# Patient Record
Sex: FEMALE | Race: WHITE | Employment: FULL TIME | URBAN - METROPOLITAN AREA
[De-identification: names, ages, dates, MRNs, and addresses within clinical notes are randomized per-mention and may not be internally consistent; named-entity substitution may affect disease eponyms.]

---

## 2023-07-03 ENCOUNTER — HOSPITAL ENCOUNTER (OUTPATIENT)
Age: 43
Discharge: HOME | End: 2023-07-03
Payer: COMMERCIAL

## 2023-07-03 VITALS
RESPIRATION RATE: 18 BRPM | SYSTOLIC BLOOD PRESSURE: 114 MMHG | HEART RATE: 67 BPM | OXYGEN SATURATION: 100 % | TEMPERATURE: 98 F | DIASTOLIC BLOOD PRESSURE: 59 MMHG

## 2023-07-03 DIAGNOSIS — J02.9 ACUTE PHARYNGITIS, UNSPECIFIED ETIOLOGY: Primary | ICD-10-CM

## 2023-07-03 LAB — S PYO AG THROAT QL: NEGATIVE

## 2023-07-03 PROCEDURE — 99203 OFFICE O/P NEW LOW 30 MIN: CPT | Performed by: NURSE PRACTITIONER

## 2023-07-03 PROCEDURE — 87880 STREP A ASSAY W/OPTIC: CPT | Performed by: NURSE PRACTITIONER

## 2023-07-03 PROCEDURE — 87081 CULTURE SCREEN ONLY: CPT | Performed by: NURSE PRACTITIONER

## 2023-07-03 RX ORDER — LIDOCAINE HYDROCHLORIDE 20 MG/ML
5 SOLUTION OROPHARYNGEAL
Qty: 100 ML | Refills: 0 | Status: SHIPPED | OUTPATIENT
Start: 2023-07-03

## 2023-07-03 RX ORDER — IPRATROPIUM BROMIDE 42 UG/1
SPRAY, METERED NASAL
COMMUNITY
Start: 2023-02-05

## 2023-07-03 NOTE — ED INITIAL ASSESSMENT (HPI)
Pt states having right side throat pain, and ear pain that began about a week ago. Pt states has been using OTC medication but states symptoms are getting worse. Pt states swallowing now is very painful. Pt denies fevers or NVD.

## 2023-12-12 ENCOUNTER — HOSPITAL ENCOUNTER (OUTPATIENT)
Age: 43
Discharge: HOME | End: 2023-12-12
Payer: COMMERCIAL

## 2023-12-12 VITALS
HEART RATE: 68 BPM | TEMPERATURE: 99 F | OXYGEN SATURATION: 100 % | DIASTOLIC BLOOD PRESSURE: 80 MMHG | SYSTOLIC BLOOD PRESSURE: 124 MMHG | RESPIRATION RATE: 18 BRPM

## 2023-12-12 DIAGNOSIS — J02.0 STREP PHARYNGITIS: Primary | ICD-10-CM

## 2023-12-12 DIAGNOSIS — R05.9 COUGH: ICD-10-CM

## 2023-12-12 LAB
S PYO AG THROAT QL: NEGATIVE
SARS-COV-2 RNA RESP QL NAA+PROBE: NOT DETECTED

## 2023-12-12 PROCEDURE — 99213 OFFICE O/P EST LOW 20 MIN: CPT | Performed by: NURSE PRACTITIONER

## 2023-12-12 PROCEDURE — U0002 COVID-19 LAB TEST NON-CDC: HCPCS | Performed by: NURSE PRACTITIONER

## 2023-12-12 PROCEDURE — 87880 STREP A ASSAY W/OPTIC: CPT | Performed by: NURSE PRACTITIONER

## 2023-12-12 RX ORDER — PENICILLIN V POTASSIUM 500 MG/1
500 TABLET ORAL 2 TIMES DAILY
Qty: 20 TABLET | Refills: 0 | Status: SHIPPED | OUTPATIENT
Start: 2023-12-12 | End: 2023-12-22

## 2023-12-12 NOTE — DISCHARGE INSTRUCTIONS
As discussed, we will treat you for suspected strep throat based on physical examination and has been being treated on antibiotics. Antibiotics twice a day for 10 days. Please change her toothbrush by Thursday. Sleep somewhat elevated and upright. Sleep with humidifier. Steam showers for cough and congestion. Drink plenty of water and get plenty of rest.  You may take any over-the-counter cold medications for your cough. Tylenol every 4 hours Motrin for 6 hours as needed for discomfort. Go to ER if you have any worsening throat pain, inability swallow your own secretions, excessive drooling, stridor, hot potato voice or muffled voice.

## 2023-12-12 NOTE — ED INITIAL ASSESSMENT (HPI)
Pt traveling last week and  has bad sore throat (strep negative here on Sunday); denies fever and states she's experiencing beginning of symptoms and does not want to get as bad as ; asking for antibiotics

## 2024-06-15 ENCOUNTER — HOSPITAL ENCOUNTER (OUTPATIENT)
Age: 44
Discharge: HOME | End: 2024-06-15
Payer: COMMERCIAL

## 2024-06-15 VITALS
TEMPERATURE: 98 F | HEART RATE: 67 BPM | OXYGEN SATURATION: 100 % | SYSTOLIC BLOOD PRESSURE: 119 MMHG | DIASTOLIC BLOOD PRESSURE: 76 MMHG | RESPIRATION RATE: 18 BRPM

## 2024-06-15 DIAGNOSIS — H65.93 FLUID LEVEL BEHIND TYMPANIC MEMBRANE OF BOTH EARS: ICD-10-CM

## 2024-06-15 DIAGNOSIS — H92.03 OTALGIA OF BOTH EARS: Primary | ICD-10-CM

## 2024-06-15 NOTE — ED PROVIDER NOTES
Patient Seen in: [facility]      History     Chief Complaint   Patient presents with    Ear Problem Pain     Stated Complaint: Ear pain    Subjective:   HPI    This is a 44-year-old female presenting with ear pain and feeling of fluid in the ear.  Patient states 4 days ago she did go swimming and got water in both ears.  Patient states when she is stressed she also gets ear pain and she is under a lot of stress.  Denies any drainage from the ear outer ear swelling redness or warmth or Q-tip use.  Denies any sore throat runny nose cough or congestion or fever.    Objective:   History reviewed. No pertinent past medical history.           History reviewed. No pertinent surgical history.             Social History     Socioeconomic History    Marital status:    Tobacco Use    Smoking status: Never    Smokeless tobacco: Never     Social Determinants of Health     Food Insecurity: No Food Insecurity (8/21/2023)    Received from [facility], [facility]    Food Insecurity     Currently or in the past 3 months, have you worried your food would run out before you had money to buy more?: No     In the past 12 months, have you run out of food or been unable to get more?: No   Transportation Needs: No Transportation Needs (8/21/2023)    Received from [facility], [facility]    Transportation Needs     Medical Transportation Needs?: No    Received from [facility], [facility]    Social Connections    Received from [facility], [facility]    Housing Stability              Review of Systems    Positive for stated complaint: Ear pain  Other systems are as noted in HPI.  Constitutional and vital signs reviewed.      All other systems reviewed and negative except as noted above.    Physical Exam     ED Triage Vitals [06/15/24 1049]   /76   Pulse 67    Resp 18   Temp 97.9 °F (36.6 °C)   Temp src Temporal   SpO2 100 %   O2 Device None (Room air)       Current Vitals:   Vital Signs  BP: 119/76  Pulse: 67  Resp: 18  Temp: 97.9 °F (36.6 °C)  Temp src: Temporal    Oxygen Therapy  SpO2: 100 %  O2 Device: None (Room air)            Physical Exam  Vitals and nursing note reviewed.   Constitutional:       Appearance: Normal appearance.   HENT:      Ears:      Comments: Bilateral TMs are nonerythematous nonbulging there is fluid level behind the TM canals are clear no mastoid TTP no pain with pulling of the tragus     Nose: Nose normal. No rhinorrhea.      Mouth/Throat:      Mouth: Mucous membranes are moist.      Pharynx: Oropharynx is clear. No posterior oropharyngeal erythema.   Eyes:      Conjunctiva/sclera: Conjunctivae normal.   Musculoskeletal:         General: Normal range of motion.      Cervical back: Normal range of motion. No rigidity or tenderness.   Lymphadenopathy:      Cervical: No cervical adenopathy.   Neurological:      General: No focal deficit present.      Mental Status: She is alert and oriented to person, place, and time.              [facility]                 Medical Decision Making  44-year-old female well-appearing and nontoxic presenting with ear pain and fullness.  DDx OM versus OE versus otalgia versus cerumen impaction versus mastoiditis.  Physical exam is essentially unremarkable other than there is fluid level behind the TM no signs of a mastoiditis and no clinical indication for labs or imaging.  Discussed the findings with the patient.  Discussed with the patient over-the-counter antihistamine or decongestant ibuprofen or Tylenol for pain and following up with PCP discussed if symptoms persist follow-up with ENT resource provided.  All questions and concerns answered for the patient.  Patient acknowledged understanding discharge instructions.    Risk  OTC drugs.        Disposition and Plan     Clinical Impression:  1. Otalgia of both ears     2. Fluid level behind tympanic membrane of both ears         Disposition:  Discharge  6/15/2024 11:12 am    Follow-up:  Yudi Andrade MD  [address] [place] [address]  [phone]      Resource for ears nose and throat specialist, If symptoms worsen      Follow-up with your primary care provider in a week              Medications Prescribed:  Discharge Medication List as of 6/15/2024 11:13 AM

## 2024-06-15 NOTE — DISCHARGE INSTRUCTIONS
For the next 2 to 3 days take Zyrtec in the morning and a dose of Benadryl at bedtime to help with the fluid behind the eardrum then just do Zyrtec daily after 2 to 3 days of the combination.  Take over-the-counter ibuprofen or Tylenol for pain.  Follow-up with your primary care provider within a week if you continue to have ear discomfort or feel like you are swimming underwater or feel like there is still fluid behind the eardrum follow-up with ears nose and throat specialist.  If you develop any drainage from the ear outer ear swelling redness or warmth severe headache neck stiffness fever nausea or vomiting go to the nearest emergency department.

## 2024-06-15 NOTE — ED INITIAL ASSESSMENT (HPI)
Pt states was swimming about 4 days ago and got water in both her ears and has not been able to get it out and now having pain in ears.

## 2025-01-04 ENCOUNTER — HOSPITAL ENCOUNTER (OUTPATIENT)
Age: 45
Discharge: HOME | End: 2025-01-04
Payer: COMMERCIAL

## 2025-01-04 VITALS
TEMPERATURE: 98 F | OXYGEN SATURATION: 100 % | RESPIRATION RATE: 20 BRPM | SYSTOLIC BLOOD PRESSURE: 110 MMHG | DIASTOLIC BLOOD PRESSURE: 90 MMHG | HEART RATE: 69 BPM

## 2025-01-04 DIAGNOSIS — J02.9 ACUTE VIRAL PHARYNGITIS: Primary | ICD-10-CM

## 2025-01-04 LAB — S PYO AG THROAT QL: NEGATIVE

## 2025-01-04 PROCEDURE — 87880 STREP A ASSAY W/OPTIC: CPT | Performed by: NURSE PRACTITIONER

## 2025-01-04 PROCEDURE — 99213 OFFICE O/P EST LOW 20 MIN: CPT | Performed by: NURSE PRACTITIONER

## 2025-01-04 NOTE — ED PROVIDER NOTES
Patient Seen in: [facility]      History     Chief Complaint   Patient presents with    Sore Throat     Stated Complaint: sore throat    Subjective:   44-year-old female with unremarkable medical history presents with complaints of slight nasal congestion, slight postnasal drip and sore throat onset 3 days ago. Sore throat gradually worse this morning.  Patient recently returned from a trip. No fever/chills, cp, sob, difficulty swallowing. Wants to ensure that she does not have strep              Objective:     History reviewed. No pertinent past medical history.           History reviewed. No pertinent surgical history.             Social History     Socioeconomic History    Marital status:    Tobacco Use    Smoking status: Never    Smokeless tobacco: Never     Social Drivers of Health     Food Insecurity: No Food Insecurity (8/21/2023)    Received from [facility], [facility]    Food Insecurity     Currently or in the past 3 months, have you worried your food would run out before you had money to buy more?: No     In the past 12 months, have you run out of food or been unable to get more?: No   Transportation Needs: No Transportation Needs (8/21/2023)    Received from [facility], [facility]    Transportation Needs     Currently or in the past 3 months, has lack of transportation kept you from medical appointments, getting food or medicine, or providing care to a family member?: Unrecognized value     Medical Transportation Needs?: No    Received from [facility], [facility]    Social Connections    Received from [facility], [facility]    Housing Stability              Review of Systems   Constitutional:  Negative for chills and fever.   HENT:  Positive for congestion, postnasal drip and sore throat.    Respiratory:  Negative for cough.     Gastrointestinal:  Negative for abdominal pain, diarrhea, nausea and vomiting.   Neurological:  Negative for headaches.   All other systems reviewed and are negative.      Positive for stated complaint: sore throat  Other systems are as noted in HPI.  Constitutional and vital signs reviewed.      All other systems reviewed and negative except as noted above.    Physical Exam     ED Triage Vitals [01/04/25 0930]   /90   Pulse 69   Resp 20   Temp 98 °F (36.7 °C)   Temp src Oral   SpO2 100 %   O2 Device None (Room air)       Current Vitals:   Vital Signs  BP: 110/90  Pulse: 69  Resp: 20  Temp: 98 °F (36.7 °C)  Temp src: Oral    Oxygen Therapy  SpO2: 100 %  O2 Device: None (Room air)        Physical Exam  Vitals and nursing note reviewed.   Constitutional:       General: She is not in acute distress.     Appearance: Normal appearance. She is not ill-appearing.   HENT:      Head: Normocephalic.      Right Ear: Tympanic membrane and external ear normal.      Left Ear: Tympanic membrane and external ear normal.      Nose: Nose normal.      Mouth/Throat:      Mouth: Mucous membranes are moist.      Pharynx: Oropharynx is clear. Uvula midline.      Tonsils: No tonsillar exudate. 1+ on the right. 1+ on the left.   Cardiovascular:      Rate and Rhythm: Normal rate and regular rhythm.   Pulmonary:      Effort: Pulmonary effort is normal.      Breath sounds: Normal breath sounds.   Musculoskeletal:         General: Normal range of motion.      Cervical back: Normal range of motion and neck supple.   Lymphadenopathy:      Cervical: No cervical adenopathy.   Skin:     General: Skin is warm.   Neurological:      Mental Status: She is alert and oriented to person, place, and time.   Psychiatric:         Behavior: Behavior is cooperative.             ED Course     Labs Reviewed   POCT RAPID STREP - Normal                   MDM              Medical Decision Making  Patient is well-appearing.  I discussed differentials with  patient including but not limited to viral vs strep pharyngitis  Rapid strep negative  Push fluids, cool mist humidifier, warm salt water gargles, good hand washing  otc meds prn  Fu with PCP. Return/ ED precautions discussed      Problems Addressed:  Acute viral pharyngitis: acute illness or injury    Amount and/or Complexity of Data Reviewed  Labs: ordered. Decision-making details documented in ED Course.    Risk  OTC drugs.        Disposition and Plan     Clinical Impression:  1. Acute viral pharyngitis         Disposition:  Discharge  1/4/2025 10:10 am    Follow-up:  Marium Laughlin MD  [address] [address]  [phone]      or follow up with your Primary Doctor          Medications Prescribed:  Discharge Medication List as of 1/4/2025 10:16 AM              Supplementary Documentation:

## 2025-03-28 ENCOUNTER — HOSPITAL ENCOUNTER (OUTPATIENT)
Age: 45
Discharge: HOME | End: 2025-03-28
Payer: COMMERCIAL

## 2025-03-28 VITALS
RESPIRATION RATE: 20 BRPM | SYSTOLIC BLOOD PRESSURE: 105 MMHG | DIASTOLIC BLOOD PRESSURE: 67 MMHG | HEART RATE: 84 BPM | TEMPERATURE: 98 F | OXYGEN SATURATION: 100 %

## 2025-03-28 DIAGNOSIS — J06.9 UPPER RESPIRATORY TRACT INFECTION, UNSPECIFIED TYPE: Primary | ICD-10-CM

## 2025-03-28 DIAGNOSIS — Z11.52 ENCOUNTER FOR SCREENING FOR COVID-19: ICD-10-CM

## 2025-03-28 LAB
POCT INFLUENZA A: NEGATIVE
POCT INFLUENZA B: NEGATIVE
S PYO AG THROAT QL: NEGATIVE
SARS-COV-2 RNA RESP QL NAA+PROBE: NOT DETECTED

## 2025-03-28 PROCEDURE — 87880 STREP A ASSAY W/OPTIC: CPT | Performed by: NURSE PRACTITIONER

## 2025-03-28 PROCEDURE — 99214 OFFICE O/P EST MOD 30 MIN: CPT | Performed by: NURSE PRACTITIONER

## 2025-03-28 PROCEDURE — 87502 INFLUENZA DNA AMP PROBE: CPT | Performed by: NURSE PRACTITIONER

## 2025-03-28 PROCEDURE — U0002 COVID-19 LAB TEST NON-CDC: HCPCS | Performed by: NURSE PRACTITIONER

## 2025-03-28 RX ORDER — ONDANSETRON 4 MG/1
4 TABLET, ORALLY DISINTEGRATING ORAL EVERY 4 HOURS PRN
Qty: 10 TABLET | Refills: 0 | Status: SHIPPED | OUTPATIENT
Start: 2025-03-28 | End: 2025-04-04

## 2025-03-28 RX ORDER — NEOMYCIN SULFATE, POLYMYXIN B SULFATE, AND DEXAMETHASONE 3.5; 10000; 1 MG/G; [USP'U]/G; MG/G
0.5 OINTMENT OPHTHALMIC
COMMUNITY
Start: 2025-03-23 | End: 2025-03-30

## 2025-03-28 NOTE — ED PROVIDER NOTES
Patient Seen in: [facility]      History     Chief Complaint   Patient presents with    Sore Throat     Nausea aches stye - Entered by patient     Stated Complaint: Sore Throat - Nausea aches stye    Subjective:   HPI  Patient is an 45-year-old female that presents to the immediate care center today with concern for mild sore throat, congestion, nausea, and myalgia that started this morning.  She was traveling last week, but denies known exposure.  Pt. has been eating and drinking without difficulty.           Objective:     No pertinent past medical history.            No pertinent past surgical history.              No pertinent social history.            Review of Systems   Constitutional:  Positive for fatigue. Negative for chills and fever.   HENT:  Positive for congestion and sore throat.    Gastrointestinal:  Positive for nausea. Negative for diarrhea and vomiting.   Genitourinary:         Patient states unequivocally she does not believe she is pregnant; she declines pregnancy testing today.   Musculoskeletal:  Positive for myalgias.   Neurological:  Negative for dizziness and headaches.       Positive for stated complaint: Sore Throat - Nausea aches stye  Other systems are as noted in HPI.  Constitutional and vital signs reviewed.      All other systems reviewed and negative except as noted above.    Physical Exam     ED Triage Vitals [03/28/25 1148]   /67   Pulse 84   Resp 20   Temp 98 °F (36.7 °C)   Temp src Oral   SpO2 100 %   O2 Device None (Room air)       Current Vitals:   Vital Signs  BP: 105/67  Pulse: 84  Resp: 20  Temp: 98 °F (36.7 °C)  Temp src: Oral    Oxygen Therapy  SpO2: 100 %  O2 Device: None (Room air)        Physical Exam  Vitals and nursing note reviewed.   Constitutional:       General: She is not in acute distress.     Appearance: She is not ill-appearing.   HENT:      Right Ear: Tympanic membrane and ear canal normal.      Left Ear: Tympanic membrane and ear canal  normal.      Nose: Nose normal.      Mouth/Throat:      Mouth: Mucous membranes are moist.      Pharynx: No posterior oropharyngeal erythema.      Tonsils: No tonsillar exudate or tonsillar abscesses. 1+ on the right. 1+ on the left.   Eyes:      Conjunctiva/sclera: Conjunctivae normal.   Pulmonary:      Effort: Pulmonary effort is normal. No respiratory distress.      Breath sounds: Normal breath sounds.   Musculoskeletal:      Cervical back: Normal range of motion and neck supple.   Skin:     General: Skin is warm and dry.      Findings: No rash.   Neurological:      Mental Status: She is alert and oriented to person, place, and time.             ED Course     Labs Reviewed   POCT RAPID STREP - Normal   RAPID SARS-COV-2 BY PCR - Normal   POCT FLU TEST - Normal    Narrative:     This assay is a rapid molecular in vitro test utilizing nucleic acid amplification of influenza A and B viral RNA.                   MDM      Patient does have a hordeolum to the left upper eyelid for which she is taking antibiotics that had previously been prescribed.  She feels that this issue is being addressed appropriately.            Medical Decision Making  Differential diagnoses considered included, but are not exclusive of: bacterial vs viral sinusitis, dehydration, pneumonia, influenza, Covid-19 infection, and other viral upper respiratory infection.     Diff dx: Viral vs bacterial pharyngitis reviewed with patient, peritonsillar abscess considered.      Problems Addressed:  Upper respiratory tract infection, unspecified type: self-limited or minor problem    Amount and/or Complexity of Data Reviewed  Labs:  Decision-making details documented in ED Course.    Risk  OTC drugs.  Prescription drug management.        Disposition and Plan     Clinical Impression:  1. Upper respiratory tract infection, unspecified type    2. Encounter for screening for COVID-19         Disposition:  Discharge  3/28/2025 12:45 pm    Follow-up:  Your  primary care provider  Call to schedule appointment to be seen in 5-7 days.    As needed          Medications Prescribed:  Current Discharge Medication List        START taking these medications    Details   ondansetron 4 MG Oral Tablet Dispersible Take 1 tablet (4 mg total) by mouth every 4 (four) hours as needed for Nausea.  Qty: 10 tablet, Refills: 0                 Supplementary Documentation:

## 2025-03-28 NOTE — ED INITIAL ASSESSMENT (HPI)
Pt  has been dealing with a sty in her left eyelid that she's being treated for.  Pt states he daughter recently had strep. Pt states today woke up feeling nauseated body aches. Pt  does have hx of recurrent strep infections.

## 2025-05-04 ENCOUNTER — ANESTHESIA EVENT (OUTPATIENT)
Dept: SURGERY | Facility: HOSPITAL | Age: 45
End: 2025-05-04
Payer: COMMERCIAL

## 2025-05-04 ENCOUNTER — APPOINTMENT (OUTPATIENT)
Dept: CT IMAGING | Facility: HOSPITAL | Age: 45
End: 2025-05-04
Attending: PHYSICIAN ASSISTANT
Payer: COMMERCIAL

## 2025-05-04 ENCOUNTER — HOSPITAL ENCOUNTER (OUTPATIENT)
Age: 45
Discharge: ANOTHER HOSPITAL | End: 2025-05-04
Attending: PHYSICIAN ASSISTANT
Payer: COMMERCIAL

## 2025-05-04 ENCOUNTER — TELEPHONE (OUTPATIENT)
Dept: CASE MANAGEMENT | Facility: HOSPITAL | Age: 45
End: 2025-05-04

## 2025-05-04 ENCOUNTER — HOSPITAL ENCOUNTER (INPATIENT)
Facility: HOSPITAL | Age: 45
LOS: 1 days | Discharge: HOME | End: 2025-05-05
Attending: INTERNAL MEDICINE | Admitting: INTERNAL MEDICINE
Payer: COMMERCIAL

## 2025-05-04 ENCOUNTER — ANESTHESIA (OUTPATIENT)
Dept: SURGERY | Facility: HOSPITAL | Age: 45
End: 2025-05-04
Payer: COMMERCIAL

## 2025-05-04 VITALS
HEART RATE: 77 BPM | DIASTOLIC BLOOD PRESSURE: 79 MMHG | OXYGEN SATURATION: 100 % | RESPIRATION RATE: 20 BRPM | SYSTOLIC BLOOD PRESSURE: 103 MMHG | TEMPERATURE: 98 F

## 2025-05-04 DIAGNOSIS — R10.31 ABDOMINAL PAIN, RLQ: ICD-10-CM

## 2025-05-04 DIAGNOSIS — K35.890 OTHER ACUTE APPENDICITIS: Primary | ICD-10-CM

## 2025-05-04 DIAGNOSIS — K38.9 APPENDICOLITH: ICD-10-CM

## 2025-05-04 DIAGNOSIS — K35.80 ACUTE APPENDICITIS: ICD-10-CM

## 2025-05-04 DIAGNOSIS — Z15.89 PALB2 GENE MUTATION POSITIVE: ICD-10-CM

## 2025-05-04 LAB
#MXD IC: 0.6 X10ˆ3/UL (ref 0.1–1)
B-HCG UR QL: NEGATIVE
BILIRUB UR QL STRIP: NEGATIVE
BUN BLD-MCNC: 16 MG/DL (ref 7–18)
CHLORIDE BLD-SCNC: 105 MMOL/L (ref 98–112)
CLARITY UR: CLEAR
CO2 BLD-SCNC: 23 MMOL/L (ref 21–32)
COLOR UR: YELLOW
CREAT BLD-MCNC: 0.9 MG/DL (ref 0.55–1.02)
EGFRCR SERPLBLD CKD-EPI 2021: 80 ML/MIN/1.73M2 (ref 60–?)
GLUCOSE BLD-MCNC: 103 MG/DL (ref 70–99)
GLUCOSE UR STRIP-MCNC: NEGATIVE MG/DL
HCT VFR BLD AUTO: 37.9 % (ref 35–48)
HCT VFR BLD CALC: 39 % (ref 34–50)
HGB BLD-MCNC: 12.8 G/DL (ref 12–16)
ISTAT IONIZED CALCIUM FOR CHEM 8: 1.27 MMOL/L (ref 1.12–1.32)
KETONES UR STRIP-MCNC: NEGATIVE MG/DL
LEUKOCYTE ESTERASE UR QL STRIP: NEGATIVE
LYMPHOCYTES # BLD AUTO: 1.7 X10ˆ3/UL (ref 1–4)
LYMPHOCYTES NFR BLD AUTO: 14 %
MCH RBC QN AUTO: 29.8 PG (ref 26–34)
MCHC RBC AUTO-ENTMCNC: 33.8 G/DL (ref 31–37)
MCV RBC AUTO: 88.3 FL (ref 80–100)
MIXED CELL %: 5.1 %
NEUTROPHILS # BLD AUTO: 9.9 X10ˆ3/UL (ref 1.5–7.7)
NEUTROPHILS NFR BLD AUTO: 80.9 %
NITRITE UR QL STRIP: NEGATIVE
PH UR STRIP: 6.5 [PH]
PLATELET # BLD AUTO: 175 X10ˆ3/UL (ref 150–450)
POTASSIUM BLD-SCNC: 3.6 MMOL/L (ref 3.6–5.1)
PROT UR STRIP-MCNC: NEGATIVE MG/DL
RBC # BLD AUTO: 4.29 X10ˆ6/UL (ref 3.8–5.3)
SODIUM BLD-SCNC: 139 MMOL/L (ref 136–145)
SP GR UR STRIP: 1.01
UROBILINOGEN UR STRIP-ACNC: <2 MG/DL
WBC # BLD AUTO: 12.2 X10ˆ3/UL (ref 4–11)

## 2025-05-04 PROCEDURE — 0DTJ4ZZ RESECTION OF APPENDIX, PERCUTANEOUS ENDOSCOPIC APPROACH: ICD-10-PCS | Performed by: SURGERY

## 2025-05-04 PROCEDURE — 81002 URINALYSIS NONAUTO W/O SCOPE: CPT | Performed by: PHYSICIAN ASSISTANT

## 2025-05-04 PROCEDURE — 44970 LAPAROSCOPY APPENDECTOMY: CPT | Performed by: SURGERY

## 2025-05-04 PROCEDURE — 74177 CT ABD & PELVIS W/CONTRAST: CPT | Performed by: PHYSICIAN ASSISTANT

## 2025-05-04 PROCEDURE — 80047 BASIC METABLC PNL IONIZED CA: CPT | Performed by: PHYSICIAN ASSISTANT

## 2025-05-04 PROCEDURE — 99215 OFFICE O/P EST HI 40 MIN: CPT | Performed by: PHYSICIAN ASSISTANT

## 2025-05-04 PROCEDURE — 99222 1ST HOSP IP/OBS MODERATE 55: CPT | Performed by: SURGERY

## 2025-05-04 PROCEDURE — 85025 COMPLETE CBC W/AUTO DIFF WBC: CPT | Performed by: PHYSICIAN ASSISTANT

## 2025-05-04 RX ORDER — NALOXONE HYDROCHLORIDE 0.4 MG/ML
0.08 INJECTION, SOLUTION INTRAMUSCULAR; INTRAVENOUS; SUBCUTANEOUS AS NEEDED
Status: DISCONTINUED | OUTPATIENT
Start: 2025-05-04 | End: 2025-05-04 | Stop reason: HOSPADM

## 2025-05-04 RX ORDER — HYDROCODONE BITARTRATE AND ACETAMINOPHEN 5; 325 MG/1; MG/1
2 TABLET ORAL EVERY 4 HOURS PRN
Status: DISCONTINUED | OUTPATIENT
Start: 2025-05-04 | End: 2025-05-05

## 2025-05-04 RX ORDER — ONDANSETRON 2 MG/ML
INJECTION INTRAMUSCULAR; INTRAVENOUS AS NEEDED
Status: DISCONTINUED | OUTPATIENT
Start: 2025-05-04 | End: 2025-05-04 | Stop reason: SURG

## 2025-05-04 RX ORDER — MIDAZOLAM HYDROCHLORIDE 1 MG/ML
INJECTION INTRAMUSCULAR; INTRAVENOUS AS NEEDED
Status: DISCONTINUED | OUTPATIENT
Start: 2025-05-04 | End: 2025-05-04 | Stop reason: SURG

## 2025-05-04 RX ORDER — PROCHLORPERAZINE EDISYLATE 5 MG/ML
5 INJECTION INTRAMUSCULAR; INTRAVENOUS EVERY 8 HOURS PRN
Status: DISCONTINUED | OUTPATIENT
Start: 2025-05-04 | End: 2025-05-05

## 2025-05-04 RX ORDER — LIDOCAINE HYDROCHLORIDE 10 MG/ML
INJECTION, SOLUTION EPIDURAL; INFILTRATION; INTRACAUDAL; PERINEURAL AS NEEDED
Status: DISCONTINUED | OUTPATIENT
Start: 2025-05-04 | End: 2025-05-04 | Stop reason: SURG

## 2025-05-04 RX ORDER — DEXAMETHASONE SODIUM PHOSPHATE 4 MG/ML
VIAL (ML) INJECTION AS NEEDED
Status: DISCONTINUED | OUTPATIENT
Start: 2025-05-04 | End: 2025-05-04 | Stop reason: SURG

## 2025-05-04 RX ORDER — CETIRIZINE HYDROCHLORIDE 1 MG/ML
5 SOLUTION ORAL DAILY
COMMUNITY

## 2025-05-04 RX ORDER — SODIUM CHLORIDE, SODIUM LACTATE, POTASSIUM CHLORIDE, CALCIUM CHLORIDE 600; 310; 30; 20 MG/100ML; MG/100ML; MG/100ML; MG/100ML
INJECTION, SOLUTION INTRAVENOUS CONTINUOUS
Status: DISCONTINUED | OUTPATIENT
Start: 2025-05-04 | End: 2025-05-04 | Stop reason: HOSPADM

## 2025-05-04 RX ORDER — MORPHINE SULFATE 2 MG/ML
2 INJECTION, SOLUTION INTRAMUSCULAR; INTRAVENOUS EVERY 2 HOUR PRN
Status: DISCONTINUED | OUTPATIENT
Start: 2025-05-04 | End: 2025-05-05

## 2025-05-04 RX ORDER — SODIUM CHLORIDE 9 MG/ML
INJECTION, SOLUTION INTRAVENOUS CONTINUOUS
Status: DISCONTINUED | OUTPATIENT
Start: 2025-05-04 | End: 2025-05-05

## 2025-05-04 RX ORDER — HYDROCODONE BITARTRATE AND ACETAMINOPHEN 5; 325 MG/1; MG/1
1 TABLET ORAL EVERY 4 HOURS PRN
Status: DISCONTINUED | OUTPATIENT
Start: 2025-05-04 | End: 2025-05-05

## 2025-05-04 RX ORDER — PROCHLORPERAZINE EDISYLATE 5 MG/ML
5 INJECTION INTRAMUSCULAR; INTRAVENOUS EVERY 8 HOURS PRN
Status: DISCONTINUED | OUTPATIENT
Start: 2025-05-04 | End: 2025-05-04 | Stop reason: HOSPADM

## 2025-05-04 RX ORDER — ONDANSETRON 2 MG/ML
4 INJECTION INTRAMUSCULAR; INTRAVENOUS EVERY 6 HOURS PRN
Status: DISCONTINUED | OUTPATIENT
Start: 2025-05-04 | End: 2025-05-05

## 2025-05-04 RX ORDER — MORPHINE SULFATE 4 MG/ML
4 INJECTION, SOLUTION INTRAMUSCULAR; INTRAVENOUS EVERY 10 MIN PRN
Status: DISCONTINUED | OUTPATIENT
Start: 2025-05-04 | End: 2025-05-04 | Stop reason: HOSPADM

## 2025-05-04 RX ORDER — IBUPROFEN 600 MG/1
600 TABLET, FILM COATED ORAL EVERY 6 HOURS PRN
Qty: 15 TABLET | Refills: 1 | Status: SHIPPED | OUTPATIENT
Start: 2025-05-04 | End: 2025-05-11

## 2025-05-04 RX ORDER — HYDROMORPHONE HYDROCHLORIDE 1 MG/ML
0.2 INJECTION, SOLUTION INTRAMUSCULAR; INTRAVENOUS; SUBCUTANEOUS EVERY 5 MIN PRN
Status: DISCONTINUED | OUTPATIENT
Start: 2025-05-04 | End: 2025-05-04 | Stop reason: HOSPADM

## 2025-05-04 RX ORDER — HYDROMORPHONE HYDROCHLORIDE 1 MG/ML
0.4 INJECTION, SOLUTION INTRAMUSCULAR; INTRAVENOUS; SUBCUTANEOUS EVERY 5 MIN PRN
Status: DISCONTINUED | OUTPATIENT
Start: 2025-05-04 | End: 2025-05-04 | Stop reason: HOSPADM

## 2025-05-04 RX ORDER — SODIUM CHLORIDE, SODIUM LACTATE, POTASSIUM CHLORIDE, CALCIUM CHLORIDE 600; 310; 30; 20 MG/100ML; MG/100ML; MG/100ML; MG/100ML
INJECTION, SOLUTION INTRAVENOUS CONTINUOUS PRN
Status: DISCONTINUED | OUTPATIENT
Start: 2025-05-04 | End: 2025-05-04 | Stop reason: SURG

## 2025-05-04 RX ORDER — MORPHINE SULFATE 2 MG/ML
2 INJECTION, SOLUTION INTRAMUSCULAR; INTRAVENOUS EVERY 10 MIN PRN
Status: DISCONTINUED | OUTPATIENT
Start: 2025-05-04 | End: 2025-05-04 | Stop reason: HOSPADM

## 2025-05-04 RX ORDER — ONDANSETRON 2 MG/ML
4 INJECTION INTRAMUSCULAR; INTRAVENOUS EVERY 6 HOURS PRN
Status: DISCONTINUED | OUTPATIENT
Start: 2025-05-04 | End: 2025-05-04 | Stop reason: HOSPADM

## 2025-05-04 RX ORDER — METRONIDAZOLE 500 MG/100ML
500 INJECTION, SOLUTION INTRAVENOUS EVERY 8 HOURS
Status: DISCONTINUED | OUTPATIENT
Start: 2025-05-04 | End: 2025-05-05

## 2025-05-04 RX ORDER — KETOROLAC TROMETHAMINE 30 MG/ML
30 INJECTION, SOLUTION INTRAMUSCULAR; INTRAVENOUS EVERY 6 HOURS PRN
Status: DISCONTINUED | OUTPATIENT
Start: 2025-05-04 | End: 2025-05-05

## 2025-05-04 RX ORDER — MORPHINE SULFATE 10 MG/ML
6 INJECTION, SOLUTION INTRAMUSCULAR; INTRAVENOUS EVERY 10 MIN PRN
Status: DISCONTINUED | OUTPATIENT
Start: 2025-05-04 | End: 2025-05-04 | Stop reason: HOSPADM

## 2025-05-04 RX ORDER — HYDROMORPHONE HYDROCHLORIDE 1 MG/ML
0.6 INJECTION, SOLUTION INTRAMUSCULAR; INTRAVENOUS; SUBCUTANEOUS EVERY 5 MIN PRN
Status: DISCONTINUED | OUTPATIENT
Start: 2025-05-04 | End: 2025-05-04 | Stop reason: HOSPADM

## 2025-05-04 RX ORDER — PHENTERMINE AND TOPIRAMATE 7.5; 46 MG/1; MG/1
CAPSULE, EXTENDED RELEASE ORAL DAILY
COMMUNITY

## 2025-05-04 RX ORDER — ROCURONIUM BROMIDE 10 MG/ML
INJECTION, SOLUTION INTRAVENOUS AS NEEDED
Status: DISCONTINUED | OUTPATIENT
Start: 2025-05-04 | End: 2025-05-04 | Stop reason: SURG

## 2025-05-04 RX ORDER — BUPIVACAINE HYDROCHLORIDE AND EPINEPHRINE 2.5; 5 MG/ML; UG/ML
INJECTION, SOLUTION INFILTRATION; PERINEURAL AS NEEDED
Status: DISCONTINUED | OUTPATIENT
Start: 2025-05-04 | End: 2025-05-04 | Stop reason: HOSPADM

## 2025-05-04 RX ORDER — MORPHINE SULFATE 2 MG/ML
1 INJECTION, SOLUTION INTRAMUSCULAR; INTRAVENOUS EVERY 2 HOUR PRN
Status: DISCONTINUED | OUTPATIENT
Start: 2025-05-04 | End: 2025-05-05

## 2025-05-04 RX ORDER — MORPHINE SULFATE 4 MG/ML
4 INJECTION, SOLUTION INTRAMUSCULAR; INTRAVENOUS EVERY 2 HOUR PRN
Status: DISCONTINUED | OUTPATIENT
Start: 2025-05-04 | End: 2025-05-05

## 2025-05-04 RX ADMIN — SODIUM CHLORIDE, SODIUM LACTATE, POTASSIUM CHLORIDE, CALCIUM CHLORIDE: 600; 310; 30; 20 INJECTION, SOLUTION INTRAVENOUS at 16:54:00

## 2025-05-04 RX ADMIN — ONDANSETRON 4 MG: 2 INJECTION INTRAMUSCULAR; INTRAVENOUS at 16:41:00

## 2025-05-04 RX ADMIN — LIDOCAINE HYDROCHLORIDE 50 MG: 10 INJECTION, SOLUTION EPIDURAL; INFILTRATION; INTRACAUDAL; PERINEURAL at 16:35:00

## 2025-05-04 RX ADMIN — DEXAMETHASONE SODIUM PHOSPHATE 4 MG: 4 MG/ML VIAL (ML) INJECTION at 16:41:00

## 2025-05-04 RX ADMIN — MIDAZOLAM HYDROCHLORIDE 2 MG: 1 INJECTION INTRAMUSCULAR; INTRAVENOUS at 16:30:00

## 2025-05-04 RX ADMIN — ROCURONIUM BROMIDE 25 MG: 10 INJECTION, SOLUTION INTRAVENOUS at 16:35:00

## 2025-05-04 RX ADMIN — SODIUM CHLORIDE, SODIUM LACTATE, POTASSIUM CHLORIDE, CALCIUM CHLORIDE: 600; 310; 30; 20 INJECTION, SOLUTION INTRAVENOUS at 16:29:00

## 2025-05-04 NOTE — ANESTHESIA PROCEDURE NOTES
Airway  Date/Time: 5/4/2025 4:37 PM  Reason: Elective    Airway not difficult    General Information and Staff   Patient location during procedure: OR  Anesthesiologist: Robin Giles MD  Performed: anesthesiologist   Performed by: Robin Giles MD  Authorized by: Robin Giles MD        Indications and Patient Condition  Indications for airway management: anesthesia  Sedation level: deep      Preoxygenated: yesPatient position: sniffing  MILS maintained throughout    Mask difficulty assessment: 1 - vent by mask    Final Airway Details    Final airway type: endotracheal airway    Successful airway: ETT  Cuffed: yes   Successful intubation technique: direct laryngoscopy  Endotracheal tube insertion site: oral  Blade: Osvaldo  Blade size: #3  ETT size (mm): 7.5    Cormack-Lehane Classification: grade I - full view of glottis  Placement verified by: capnometry   Cuff volume (mL): 6  Measured from: teeth  ETT to teeth (cm): 21  Number of attempts at approach: 1  Number of other approaches attempted: 0

## 2025-05-04 NOTE — ED PROVIDER NOTES
Patient Seen in: [facility]      History     Chief Complaint   Patient presents with    Abdominal Pain     Stated Complaint: Abdominal pain    Subjective:   HPI    Patient is a 35-year-old female with PALB2 gene mutation positive, increased risk of breast cancer, family history of pancreatic cancer, presenting to immediate care for evaluation of abdominal pain.  Onset: Last night.  Initially abdominal pain and upper abdomen.  Symptoms rating to right lower quadrant.  She is concerned for possible appendicitis.  She had episode of nausea with black stool this morning.  Patient attributes symptoms to possibly she ate yesterday.  States she ate chicken sausage, chomp beef stick, and ate chorizo for dinner. 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 She denies any fevers.  No chest pain or shortness of breath.  No back, flank, pelvic pain.  No urinary symptoms.  No blood in stools.  Denies diarrhea or constipation.  Gets routine ultrasound and MRI/MRCP abdomen for pancreatic history surveillance..  Prior colonoscopy notable for colonic polyps.      History of Present Illness               Objective:     History reviewed. No pertinent past medical history.           History reviewed. No pertinent surgical history.             No pertinent social history.            Review of Systems   Constitutional:  Negative for activity change, chills and fever.   Respiratory:  Negative for shortness of breath.    Cardiovascular:  Negative for chest pain.   Gastrointestinal:  Positive for abdominal pain and nausea. Negative for abdominal distention, anal bleeding, blood in stool, constipation, diarrhea, rectal pain and vomiting.   Genitourinary:  Negative for difficulty urinating, dysuria, flank pain and hematuria.   Musculoskeletal:  Negative for back pain.   Skin:  Negative for rash.   Allergic/Immunologic: Negative for immunocompromised state.   Neurological:  Negative for dizziness, weakness, light-headedness and  headaches.   Psychiatric/Behavioral:  Negative for confusion.    All other systems reviewed and are negative.      Positive for stated complaint: Abdominal pain  Other systems are as noted in HPI.  Constitutional and vital signs reviewed.      All other systems reviewed and negative except as noted above.                  Physical Exam     ED Triage Vitals [05/04/25 0814]   /79   Pulse 77   Resp 20   Temp 98.4 °F (36.9 °C)   Temp src Oral   SpO2 100 %   O2 Device None (Room air)       Current Vitals:   Vital Signs  BP: 103/79  Pulse: 77  Resp: 20  Temp: 98.4 °F (36.9 °C)  Temp src: Oral    Oxygen Therapy  SpO2: 100 %  O2 Device: None (Room air)        Physical Exam  Vitals and nursing note reviewed.   Constitutional:       General: She is not in acute distress.     Appearance: Normal appearance. She is well-developed. She is not ill-appearing, toxic-appearing or diaphoretic.      Comments: Alert, well-appearing, no acute distress.   HENT:      Head: Normocephalic and atraumatic.      Mouth/Throat:      Mouth: Mucous membranes are moist.   Eyes:      Conjunctiva/sclera: Conjunctivae normal.   Cardiovascular:      Rate and Rhythm: Normal rate and regular rhythm.      Pulses: Normal pulses.   Pulmonary:      Effort: Pulmonary effort is normal. No respiratory distress.   Abdominal:      Tenderness: There is abdominal tenderness in the right lower quadrant.      Comments: Tenderness to palpation periumbilical and right lower quadrant.  No guarding or rebound tenderness.  Soft abdomen.  Negative Rodriguez signs.  No flank or CVA tenderness.   Musculoskeletal:         General: No deformity. Normal range of motion.      Cervical back: Normal range of motion. No rigidity.   Skin:     Capillary Refill: Capillary refill takes less than 2 seconds.      Coloration: Skin is not mottled.      Findings: No erythema.   Neurological:      General: No focal deficit present.      Mental Status: She is alert and oriented to person,  place, and time.      GCS: GCS eye subscore is 4. GCS verbal subscore is 5. GCS motor subscore is 6.      Motor: No weakness.      Gait: Gait normal.      Comments: Moving all extremities, full range of motion, nontender, no edema   Psychiatric:         Mood and Affect: Mood normal. Mood is not anxious or depressed.         Behavior: Behavior normal.         Physical Exam                ED Course     Labs Reviewed   POCT CBC - Abnormal; Notable for the following components:       Result Value    WBC IC 12.2 (*)     # Neutrophil 9.9 (*)     All other components within normal limits   [facility] POCT URINALYSIS DIPSTICK - Abnormal; Notable for the following components:    Blood, Urine Trace-lysed (*)     All other components within normal limits   POCT ISTAT CHEM8 CARTRIDGE - Abnormal; Notable for the following components:    ISTAT Glucose 103 (*)     All other components within normal limits     Results for orders placed or performed during the hospital encounter of 05/04/25   POCT CBC    Collection Time: 05/04/25  8:46 AM   Result Value Ref Range    WBC IC 12.2 (H) 4.0 - 11.0 x10ˆ3/uL    RBC IC 4.29 3.80 - 5.30 X10ˆ6/uL    HGB IC 12.8 12.0 - 16.0 g/dL    HCT IC 37.9 35.0 - 48.0 %    MCV IC 88.3 80.0 - 100.0 fL    MCH IC 29.8 26.0 - 34.0 pg    MCHC IC 33.8 31.0 - 37.0 g/dL    PLT .0 150.0 - 450.0 X10ˆ3/uL    # Neutrophil 9.9 (H) 1.5 - 7.7 X10ˆ3/uL    # Lymphocyte 1.7 1.0 - 4.0 X10ˆ3/uL    # Mixed Cells 0.6 0.1 - 1.0 X10ˆ3/uL    Neutrophil % 80.9 %    Lymphocyte % 14.0 %    Mixed Cell % 5.1 %   POCT Urinalysis Dipstick    Collection Time: 05/04/25  8:52 AM   Result Value Ref Range    Urine Color Yellow Yellow    Urine Clarity Clear Clear    Specific Gravity, Urine 1.010 1.005 - 1.030    PH, Urine 6.5 5.0 - 8.0    Protein urine Negative Negative mg/dL    Glucose, Urine Negative Negative mg/dL    Ketone, Urine Negative Negative mg/dL    Bilirubin, Urine Negative Negative    Blood, Urine Trace-lysed (A) Negative     Nitrite Urine Negative Negative    Urobilinogen urine <2.0 <2.0 mg/dL    Leukocyte esterase urine Negative Negative   POCT ISTAT chem8 cartridge    Collection Time: 05/04/25  9:00 AM   Result Value Ref Range    ISTAT Sodium 139 136 - 145 mmol/L    ISTAT BUN 16 7 - 18 mg/dL    ISTAT Potassium 3.6 3.6 - 5.1 mmol/L    ISTAT Chloride 105 98 - 112 mmol/L    ISTAT Ionized Calcium 1.27 1.12 - 1.32 mmol/L    ISTAT Hematocrit 39 34 - 50 %    ISTAT Glucose 103 (H) 70 - 99 mg/dL    ISTAT TCO2 23 21 - 32 mmol/L    ISTAT Creatinine 0.90 0.55 - 1.02 mg/dL    eGFR-Cr 80 >=60 mL/min/1.73m2     CT APPENDIX ABD/PEL W CONTRAST (CPT=74177)   Final Result   PROCEDURE: CT APPENDIX ABD PEL W CONTRAST (CPT=74177)       COMPARISON: None available.         INDICATIONS: Right upper and lower quadrant abdominal pain. History of    gene mutation for high risk pancreatic cancer with family history.       TECHNIQUE: Multidetector CT images of the abdomen and pelvis were obtained    with non-ionic intravenous contrast material. Automated exposure control    for dose reduction was used. Adjustment of the mA and/or kV was done based    on the patient's size.    Iterative reconstruction technique for dose reduction was employed. Dose    information was transmitted to the ACR (American College of Radiology)    NRDR (National Radiology Data Registry), which includes the Dose Index    Registry. Oral contrast was not    ingested.       FINDINGS:   LUNG BASES: The heart is normal in size. There is no visible pulmonary or    pleural disease.    LIVER: No enlargement, atrophy, abnormal density, or significant focal    lesion is identified.     BILIARY: The gallbladder is present.   PANCREAS: No lesion, fluid collection, ductal dilatation, or atrophy.     SPLEEN: No enlargement.     ADRENALS:   No defined mass or abnormal enlargement.     KIDNEYS:   Symmetric enhancement is seen without evidence of    hydronephrosis or underlying solid masses.     GI/MESENTERY:  Fluid-filled loops of small bowel are present. There is no    evidence of bowel obstruction. There is a suspected punctate    appendicoliths at the base of the cecum/appendiceal origin (series 5,    image 29; series 2, image 95). The appendix is    dilated in caliber, measuring up to 1.1 cm, and is fluid-filled with    concurrent mucosal hyperemia and surrounding periappendiceal infiltration.    No loculated, drainable periappendiceal fluid collection is seen to    suggest abscess formation.     URINARY BLADDER: No visible calculus or focal wall thickening.     PELVIC NODES: No lymphadenopathy.      PELVIC ORGANS: The uterus is present. A left adnexal follicular cystic    lesion is seen measuring 1.8 x 1.5 x 1.4 cm. Small volume free pelvic    fluid is observed. Deep pelvic calcifications likely represent    phleboliths.      VASCULATURE:   No aneurysm is detected.   RETROPERITONEUM: No mass or lymphadenopathy is apparent.     BONES:   Mild left convex curvature of the lumbar spine is present. Mild    multilevel degenerative changes of the spine are appreciated.   ABDOMINAL WALL: There is a small fat-containing umbilical hernia.   OTHER: No free air is seen in the abdomen or pelvis.                         =====   CONCLUSION:    1. Acute appendicitis with associated apparent obstructing proximal    appendicolith. There is no evidence of periappendiceal abscess formation.        2. Left ovarian probable physiologic follicle.       3. Lesser incidental findings as above.               This report was communicated by telephone to the CT technologist at the    dictation time shown below.  She will notify the patient's physician.             Dictated by (CST): Jn Bustillos MD on 5/04/2025 at 10:08 AM        Finalized by (CST): Jn Bustillos MD on 5/04/2025 at 10:13 AM                      Results                   MDM     Patient is 45-year-old female with history above, presenting to immediate care  for evaluation of abdominal pain that started last night.  Initially upper abdomen/periumbilical and now rating to right lower quadrant.  She is concerned for possible appendicitis.  No fever or systemic symptoms.  Does endorse slight initial nausea and dark stool this morning.  Patient is without fever.  She is well-appearing.  She is tender to palpation right lower quadrant without guarding or rebound tenderness.  Further evaluation with abdominal screening labs.  Labs notable for leukocytosis with left shift.  WBC 12.2.  Normal renal function.  Urine negative for UTI.  Patient without urinary symptoms.  Further evaluation of right lower quadrant pain with rule out acute appendicitis as clinically question.  CT abdomen pelvis with acute appendicitis with appendicolith.  No abscess.  Patient requires hospital admission  for IV antibiotics and surgical management of acute appendicitis.  Case discussed with hospitalist, admitting MD Dr. Hanna De Anda.  General surgery Dr. Rodriguez notified.  Patient currently NPO.  Last meal 7 AM this morning.      Medical Decision Making      Disposition and Plan     Clinical Impression:  1. Other acute appendicitis    2. Abdominal pain, RLQ    3. Appendicolith    4. PALB2 gene mutation positive         Disposition:  Ic direct admit  5/4/2025 10:26 am    Follow-up:  No follow-up provider specified.        Medications Prescribed:  Current Discharge Medication List          Supplementary Documentation:

## 2025-05-04 NOTE — BRIEF OP NOTE
Pre-Operative Diagnosis: Acute appendicitis [K35.80]     Post-Operative Diagnosis: Acute appendicitis [K35.80]      Procedure Performed:   LAPAROSCOPIC  APPENDECTOMY    Surgeons and Role:     * Charlie Rodriguez MD - Primary    Assistant(s):  Surgical Assistant.: Salvador Robles CSA     Surgical Findings: Acute appendicitis     Specimen: Appendix     Estimated Blood Loss: Blood Output: 5 mL (5/4/2025  4:50 PM)      Dictation Number:      Charlie Rodriguez MD  5/4/2025  4:56 PM

## 2025-05-04 NOTE — PLAN OF CARE
A&O x4, RA, continent, ambulating. Clear diet, advance as tolerated. 3 incisions,, 2 steristrips with bandaids, 1 4x4 derma bond.     Problem: PAIN - ADULT  Goal: Verbalizes/displays adequate comfort level or patient's stated pain goal  Description: INTERVENTIONS:- Encourage pt to monitor pain and request assistance- Assess pain using appropriate pain scale- Administer analgesics based on type and severity of pain and evaluate response- Implement non-pharmacological measures as appropriate and evaluate response- Consider cultural and social influences on pain and pain management- Manage/alleviate anxiety- Utilize distraction and/or relaxation techniques- Monitor for opioid side effects- Notify MD/LIP if interventions unsuccessful or patient reports new pain- Anticipate increased pain with activity and pre-medicate as appropriate  Outcome: Progressing     Problem: RISK FOR INFECTION - ADULT  Goal: Absence of fever/infection during anticipated neutropenic period  Description: INTERVENTIONS- Monitor WBC- Administer growth factors as ordered- Implement neutropenic guidelines  Outcome: Progressing

## 2025-05-04 NOTE — ANESTHESIA PREPROCEDURE EVALUATION
Anesthesia PreOp Note    HPI:     Odalys Roche is a 45 year old female who presents for preoperative consultation requested by: Charlie Rodriguez MD    Date of Surgery: 5/4/2025    Procedure(s):  LAPAROSCOPIC  POSSIBLE OPEN APPENDECTOMY  Indication: Acute appendicitis [K35.80]    Relevant Problems   No relevant active problems       NPO:  Last Liquid Consumption Date: 05/04/25  Last Liquid Consumption Time: 0930  Last Solid Consumption Date: 05/04/25  Last Solid Consumption Time: 0730  Last Liquid Consumption Date: 05/04/25          History Review:  Patient Active Problem List    Diagnosis Date Noted   • Acute appendicitis 05/04/2025       Past Medical History[1]    Past Surgical History[2]    Prescriptions Prior to Admission[3]  Current Medications and Prescriptions Ordered in Epic[4]    Allergies[5]    Family History[6]  Social Hx on file[7]    Available pre-op labs reviewed.  Lab Results   Component Value Date    MCV 88.3 05/04/2025    MCHC 33.8 05/04/2025    PREGU Negative 05/04/2025             Vital Signs:  There is no height or weight on file to calculate BMI.   oral temperature is 98.4 °F (36.9 °C). Her blood pressure is 111/75 and her pulse is 74. Her respiration is 16 and oxygen saturation is 100%.   Vitals:    05/04/25 1216   BP: 111/75   Pulse: 74   Resp: 16   Temp: 98.4 °F (36.9 °C)   TempSrc: Oral   SpO2: 100%        Anesthesia Evaluation     Patient summary reviewed and Nursing notes reviewed    No history of anesthetic complications   Airway   Mallampati: II  TM distance: >3 FB  Neck ROM: full  Dental - Dentition appears grossly intact         Pulmonary - negative ROS and normal exam   Cardiovascular - negative ROS and normal exam  Exercise tolerance: good    Neuro/Psych - negative ROS     GI/Hepatic/Renal - negative ROS     Endo/Other - negative ROS   Abdominal  - normal exam               Anesthesia Plan:   ASA:  1  Emergent    Plan:   General  Airway:  ETT  Post-op Pain Management: IV  analgesics and Oral pain medication  Informed Consent Plan and Risks Discussed With:  Patient and spouse      I have informed Odalys Roche of the nature of the anesthetic plan, benefits, risks including possible dental damage if relevant, major complications, and any alternative forms of anesthetic management.   All of the patient's questions were answered to the best of my ability. The patient desires the anesthetic management as planned.  ELEANOR BAER MD  5/4/2025 4:11 PM  Present on Admission:  **None**             [1]  No past medical history on file.  [2]  No past surgical history on file.  [3]  Medications Prior to Admission   Medication Sig Dispense Refill Last Dose/Taking   • cetirizine 1 MG/ML Oral Solution Take 5 mL (5 mg total) by mouth daily.   5/4/2025   • Phentermine-Topiramate (QSYMIA) 7.5-46 MG Oral Capsule SR 24 Hr Take 7.5-46 mg by mouth daily.   5/4/2025   [4]  Current Facility-Administered Medications Ordered in Epic   Medication Dose Route Frequency Provider Last Rate Last Admin   • [Transfer Hold] sodium chloride 0.9 % IV bolus 1,000 mL  1,000 mL Intravenous Once Ahmed, Asrar, DO       • sodium chloride 0.9% infusion   Intravenous Continuous Ahmed, Asrar,  mL/hr at 05/04/25 1446 New Bag at 05/04/25 1446   • cefTRIAXone (Rocephin) 2 g in sodium chloride 0.9% 100 mL IVPB-ADDV  2 g Intravenous Q24H Ahmed, Asrar,  mL/hr at 05/04/25 1400 2 g at 05/04/25 1400   • metroNIDAZOLE in sodium chloride 0.79% (Flagyl) 5 mg/mL IVPB premix 500 mg  500 mg Intravenous Q8H Ahmed, Asrar,  mL/hr at 05/04/25 1445 500 mg at 05/04/25 1445   • [Transfer Hold] morphINE PF 2 MG/ML injection 1 mg  1 mg Intravenous Q2H PRN Ahmed, Asrar, DO        Or   • [Transfer Hold] morphINE PF 2 MG/ML injection 2 mg  2 mg Intravenous Q2H PRN Ahmed, Asrar, DO        Or   • [Transfer Hold] morphINE PF 4 MG/ML injection 4 mg  4 mg Intravenous Q2H PRN Ahmed, Asrar,        • [Transfer Hold] ondansetron  (Zofran) 4 MG/2ML injection 4 mg  4 mg Intravenous Q6H PRN Adilson Asrar, DO       • [Transfer Hold] prochlorperazine (Compazine) 10 MG/2ML injection 5 mg  5 mg Intravenous Q8H PRN Adilson, Asrar, DO         No current Epic-ordered outpatient medications on file.   [5]  Allergies  Allergen Reactions   • Shellfish Allergy HIVES   [6]  No family history on file.  [7]  Social History  Socioeconomic History   • Marital status:    Tobacco Use   • Smoking status: Never   • Smokeless tobacco: Never

## 2025-05-04 NOTE — H&P
[facility] Hospitalist H&P       CC: abdominal pain     PCP: No primary care provider on file.    History of Present Illness:   45-year-old female with a history of PALB2 gene mutation given her increased risk of breast cancer, history of pancreatic cancer in her father, who presents to the hospital for evaluation of abdominal pain which started suddenly last night.  The patient states that the pain was definitely in her lower abdomen.  She thought initially that it was gas, or even constipation.  She attempted to have a bowel movement last night.  She states while she was attempting this her pain was worse.  In the morning she did have a bowel movement but states that it appeared different than her usual.  She told her  to drive her to the immediate care.  She denies any recent fever.    Review of Systems  Comprehensive ROS reviewed and negative except for what's stated above.     PMH  Past Medical History[1]     PSH  Past Surgical History[2]     ALL:  Allergies[3]     Home Medications:  Medications Taking[4]      Soc Hx  Social History     Tobacco Use    Smoking status: Never    Smokeless tobacco: Never   Substance Use Topics    Alcohol use: Not on file        Fam Hx  Family History[5]      OBJECTIVE:  /75 (BP Location: Left arm)   Pulse 74   Temp 98.4 °F (36.9 °C) (Oral)   Resp 16   LMP 04/27/2025   SpO2 100%   General: Alert, no acute distress  Lungs: clear to ausculation bilaterally  Heart: Regular rate and rhythm  Abdomen: soft, tender in RLQ  Extremities: No edema    Diagnostic Data:    CBC/Chem  Recent Labs   Lab 05/04/25  0846   MCV 88.3     Radiology: CT APPENDIX ABD/PEL W CONTRAST (CPT=74177)  Result Date: 5/4/2025  CONCLUSION:  1. Acute appendicitis with associated apparent obstructing proximal appendicolith. There is no evidence of periappendiceal abscess formation.  2. Left ovarian probable physiologic follicle.  3. Lesser incidental findings as above.    This report was  communicated by telephone to the CT technologist at the dictation time shown below.  She will notify the patient's physician.    Dictated by (CST): Jn Bustillos MD on 5/04/2025 at 10:08 AM     Finalized by (CST): Jn Bustillos MD on 5/04/2025 at 10:13 AM          ASSESSMENT / PLAN:   45-year-old female with a history of PALB2 gene mutation given her increased risk of breast cancer, history of pancreatic cancer in her father, who presents to the hospital for evaluation of abdominal pain which started suddenly last night.     Acute appendicitis  -give IV saline bolus now, start infusion of saline  -IV antibiotics with rocephin and flagyl ordered  -general surgery consult  -NPO  -IV pain meds and antiemetics as needed  -she is fairly ok in regards to pain when I saw her     Fluids: 0.9 saline  Diet: NPO  DVT prophylaxis: hold chemical prophy  Code status: full code    Hanna De Anda DO  [facility]            [1] No past medical history on file.  [2] No past surgical history on file.  [3]   Allergies  Allergen Reactions    Shellfish Allergy HIVES   [4]   Outpatient Medications Marked as Taking for the 5/4/25 encounter (Hospital Encounter)   Medication Sig Dispense Refill    cetirizine 1 MG/ML Oral Solution Take 5 mL (5 mg total) by mouth daily.     [5] No family history on file.

## 2025-05-04 NOTE — DISCHARGE INSTRUCTIONS
Ice pack as needed.  May shower in 24 hours.  Remove outer Band-Aids in 24 hours leave white strips for 1 week  Ibuprofen 600 mg every 6 hours for pain, Tylenol for breakthrough pain  Advance diet as tolerated.  Milk of magnesia or MiraLAX if constipated  Follow-up with PA in 2 weeks for wound check  15 pound lifting restriction for weeks.  May drive in 1 week or sooner if pain-free

## 2025-05-04 NOTE — OPERATIVE REPORT
[facility]    PATIENT'S NAME: ZAHRA UMRGUIA   ATTENDING PHYSICIAN: Hanna De Anda DO   OPERATING PHYSICIAN: Charlie Rodriguez MD   PATIENT ACCOUNT#:   [id number]    LOCATION:  [address] [place] [address]  MEDICAL RECORD #:   [id number]       YOB: 1980  ADMISSION DATE:       2025      OPERATION DATE:  2025    OPERATIVE REPORT      PREOPERATIVE DIAGNOSIS:  Acute appendicitis.  POSTOPERATIVE DIAGNOSIS:  Acute appendicitis.  PROCEDURE:  Laparoscopic appendectomy.    ASSISTANT:  SUKHJINDER Esparza    ESTIMATED BLOOD LOSS:  Minimal.    COMPLICATIONS:  None.    ANESTHESIA:  General.     DISPOSITION:  To Recovery, tolerated well.    INDICATIONS:  The patient is a pleasant 45-year-old with the above complaint.  Consent obtained.     OPERATIVE TECHNIQUE:  She was taken surgery, was prepped and draped in usual sterile fashion.  Veress needle placed at Rodriguez's point.  Abdomen was insufflated.  5 mm trocar placed using Optiview.  A 12 was placed at the umbilicus, a 5 in her  scar.  Exploration revealed acute appendicitis.  There was some scant blood in the pouch of Zhang which was aspirated.  Small paratubal cysts identified bilaterally.  Appendix mobilized with LigaSure.  Base transected with Endo MAGDALENE stapler and retrieved using an endobag.  Abdomen irrigated.  Hemostasis verified.  Trocars removed.  The 12 site closed with 0 Vicryl.  Skin closed with 3-0 Monocryl, benzoin, and Steri-Strips.  Marcaine infiltrated for local block.    Dictated By Charlie Rodriguez MD  d: 2025 16:59:32  t: 2025 18:24:37  [facility] [id number]  GL/

## 2025-05-04 NOTE — ANESTHESIA POSTPROCEDURE EVALUATION
Patient: Odalys Roche    Procedure Summary       Date: 05/04/25 Room / Location: [facility] MAIN OR  / [facility] MAIN OR    Anesthesia Start: 1629 Anesthesia Stop: 1705    Procedure: LAPAROSCOPIC  APPENDECTOMY (Abdomen) Diagnosis:       Acute appendicitis      (Acute appendicitis [K35.80])    Surgeons: Charlie Rodriguez MD Anesthesiologist: Eleanor Giles MD    Anesthesia Type: general ASA Status: 1 - Emergent            Anesthesia Type: general    Vitals Value Taken Time   BP 97/47 05/04/25 17:05   Temp 98.6 °F (37 °C) 05/04/25 17:05   Pulse 80 05/04/25 17:05   Resp 17 05/04/25 17:05   SpO2 99 % 05/04/25 17:05   Vitals shown include unfiled device data.    [facility] AN Post Evaluation:   Patient Evaluated in PACU  Patient Participation: complete - patient participated  Level of Consciousness: awake and alert  Pain Score: 0  Pain Management: adequate  Airway Patency:patent  Yes    Nausea/Vomiting: none  Cardiovascular Status: acceptable  Respiratory Status: acceptable  Postoperative Hydration acceptable      ELEANOR GILES MD  5/4/2025 5:05 PM

## 2025-05-04 NOTE — ED INITIAL ASSESSMENT (HPI)
Pt came in due to abdominal pain, nausea, one episode of black stool this morning. Pt stated her abdominal pain started last night  in upper abdomen and now has radiated to lower abdominal area. Pt stated she is concerned for possible appendicitis. Pt has easy non labored respirations.

## 2025-05-04 NOTE — CONSULTS
[facility]    PATIENT'S NAME: ZAHRA MURGUIA   ATTENDING PHYSICIAN: Hanna De Anda DO   CONSULTING PHYSICIAN: Charlie Rodriguez MD   PATIENT ACCOUNT#:   [id number]    LOCATION:  [address] [place] [address]  MEDICAL RECORD #:   [id number]       YOB: 1980  ADMISSION DATE:       05/04/2025      CONSULT DATE:  05/04/2025    REPORT OF CONSULTATION    REASON FOR CONSULTATION:  Acute appendicitis.    HISTORY OF PRESENT ILLNESS:  The patient is 45, admitted to the hospital with sudden onset of abdominal pain localized to the lower abdomen.  CT suggests acute appendicitis.  She denies fevers.    PAST MEDICAL HISTORY:  None.    PAST SURGICAL HISTORY:  None.     MEDICATIONS:  Cetirizine.    ALLERGIES:  Shellfish.      SOCIAL HISTORY:  Does not smoke, drink, or take illicits.    FAMILY HISTORY:  Noncontributory.    PHYSICAL EXAMINATION:    GENERAL:  Appears well, in no distress.   VITAL SIGNS:  98.4.  Stable vital signs.  HEENT:  EOMI.  PERRLA.  NECK:  Supple without lymphadenopathy.   LUNGS:  Clear to auscultation.  HEART:  Regular rate and rhythm.  No murmur, rubs, or gallops.  ABDOMEN:  Soft.  Tender right lower quadrant.  No rebound or guarding.  EXTREMITIES:  Without clubbing, cyanosis, or edema.    LABORATORY DATA:  White count 12.2.    CT scan:  Acute appendicitis with proximal fecalith.  No abscess.      IMPRESSION:  Acute appendicitis.    PLAN:  Laparoscopic appendectomy.  Risks, benefits, options explained.    Dictated By Charlie Rodriguez MD  d: 05/04/2025 15:24:49  t: 05/04/2025 15:52:39  [facility] [id number]  /

## 2025-05-05 VITALS
HEIGHT: 64.17 IN | RESPIRATION RATE: 18 BRPM | SYSTOLIC BLOOD PRESSURE: 96 MMHG | BODY MASS INDEX: 23.56 KG/M2 | OXYGEN SATURATION: 100 % | DIASTOLIC BLOOD PRESSURE: 59 MMHG | WEIGHT: 138 LBS | TEMPERATURE: 98 F | HEART RATE: 62 BPM

## 2025-05-05 LAB
ANION GAP SERPL CALC-SCNC: 9 MMOL/L (ref 0–18)
BASOPHILS # BLD AUTO: 0.02 X10(3) UL (ref 0–0.2)
BASOPHILS NFR BLD AUTO: 0.3 %
BUN BLD-MCNC: 10 MG/DL (ref 9–23)
BUN/CREAT SERPL: 12 (ref 10–20)
CALCIUM BLD-MCNC: 8.3 MG/DL (ref 8.7–10.4)
CHLORIDE SERPL-SCNC: 109 MMOL/L (ref 98–112)
CO2 SERPL-SCNC: 23 MMOL/L (ref 21–32)
CREAT BLD-MCNC: 0.83 MG/DL (ref 0.55–1.02)
DEPRECATED RDW RBC AUTO: 41.6 FL (ref 35.1–46.3)
EGFRCR SERPLBLD CKD-EPI 2021: 89 ML/MIN/1.73M2 (ref 60–?)
EOSINOPHIL # BLD AUTO: 0.01 X10(3) UL (ref 0–0.7)
EOSINOPHIL NFR BLD AUTO: 0.2 %
ERYTHROCYTE [DISTWIDTH] IN BLOOD BY AUTOMATED COUNT: 12.9 % (ref 11–15)
GLUCOSE BLD-MCNC: 111 MG/DL (ref 70–99)
HCT VFR BLD AUTO: 31 % (ref 35–48)
HGB BLD-MCNC: 10.3 G/DL (ref 12–16)
IMM GRANULOCYTES # BLD AUTO: 0.01 X10(3) UL (ref 0–1)
IMM GRANULOCYTES NFR BLD: 0.2 %
LYMPHOCYTES # BLD AUTO: 0.94 X10(3) UL (ref 1–4)
LYMPHOCYTES NFR BLD AUTO: 15 %
MCH RBC QN AUTO: 29.1 PG (ref 26–34)
MCHC RBC AUTO-ENTMCNC: 33.2 G/DL (ref 31–37)
MCV RBC AUTO: 87.6 FL (ref 80–100)
MONOCYTES # BLD AUTO: 0.44 X10(3) UL (ref 0.1–1)
MONOCYTES NFR BLD AUTO: 7 %
NEUTROPHILS # BLD AUTO: 4.85 X10 (3) UL (ref 1.5–7.7)
NEUTROPHILS # BLD AUTO: 4.85 X10(3) UL (ref 1.5–7.7)
NEUTROPHILS NFR BLD AUTO: 77.3 %
OSMOLALITY SERPL CALC.SUM OF ELEC: 292 MOSM/KG (ref 275–295)
PLATELET # BLD AUTO: 161 10(3)UL (ref 150–450)
PLATELETS.RETICULATED NFR BLD AUTO: 5.3 % (ref 0–7)
POTASSIUM SERPL-SCNC: 4.2 MMOL/L (ref 3.5–5.1)
RBC # BLD AUTO: 3.54 X10(6)UL (ref 3.8–5.3)
SODIUM SERPL-SCNC: 141 MMOL/L (ref 136–145)
WBC # BLD AUTO: 6.3 X10(3) UL (ref 4–11)

## 2025-05-05 RX ORDER — HYDROCODONE BITARTRATE AND ACETAMINOPHEN 5; 325 MG/1; MG/1
1 TABLET ORAL EVERY 6 HOURS PRN
Qty: 10 TABLET | Refills: 0 | Status: SHIPPED | OUTPATIENT
Start: 2025-05-05

## 2025-05-05 NOTE — PROGRESS NOTES
Patient cleared for discharge today. Tolerating regular diet. Discharge education provided at bedside. All questions and concerns addressed. All belongings packed and taken with patient. Patient being taken home with .

## 2025-05-05 NOTE — PROGRESS NOTES
[facility]  Progress Note    Odalys Roche Patient Status:  Inpatient    1980 MRN [id number]   Location [facility] 4W/SW/SE Attending Hanna De Anda,    Hosp Day # 1 PCP No primary care provider on file.     Subjective:  Pt seen laying in bed. Having some incisional pain and some mild upper chest pain, likely related to anesthesia. No nausea or vomiting, tolerating diet well. No fever or chills.    Objective/Physical Exam:  General: Alert, orientated x3.  Cooperative.  No apparent distress.  Vital Signs:  Blood pressure 96/59, pulse 62, temperature 97.5 °F (36.4 °C), temperature source Temporal, resp. rate 18, height 5' 4.17\" (1.63 m), weight 138 lb (62.6 kg), last menstrual period 2025, SpO2 100%.  Wt Readings from Last 3 Encounters:   25 138 lb (62.6 kg)     Lungs: No respiratory distress.  Cardiac: Regular rate and rhythm.   Abdomen:  Soft, non distended, expected incisional tender, with no rebound or guarding.  No peritoneal signs.   Extremities:  No lower extremity edema noted.    Incision: Clean, dry, intact, no erythema    Intake/Output:    Intake/Output Summary (Last 24 hours) at 2025 1058  Last data filed at 2025 0352  Gross per 24 hour   Intake 1650 ml   Output 5 ml   Net 1645 ml     I/O last 3 completed shifts:  In: 1650 [I.V.:1650]  Out: 5 [Blood:5]  No intake/output data recorded.    Medications: Scheduled Medications[1]    Labs:  Lab Results   Component Value Date    WBC 6.3 2025    HGB 10.3 2025    HCT 31.0 2025    .0 2025     Lab Results   Component Value Date     2025    K 4.2 2025     2025    CO2 23.0 2025    BUN 10 2025    CREATSERUM 0.83 2025     2025    CA 8.3 2025     No results found for: \"PT\", \"INR\"      Assessment  Problem List[2]    POD1 s/p lap brigida    Plan:  Stable for discharge home from surgical standpoint  Follow up in 2 weeks for  postoperative appointment  Analgesics and antiemetics as needed  Ambulate and up to chair    Quality:  DVT Mechanical Prophylaxis:        DVT Pharmacologic Prophylaxis   Medication   None                Code Status: Not on file  Phillips: No urinary catheter in place  Phillips Duration (in days):   Central line:    CHESTER: 5/5/2025        Gilson Graf PA-C  5/5/2025  10:58 AM               [1]    [Transfer Hold] sodium chloride  1,000 mL Intravenous Once    cefTRIAXone  2 g Intravenous Q24H    metroNIDAZOLE  500 mg Intravenous Q8H   [2]   Patient Active Problem List  Diagnosis    Acute appendicitis

## 2025-05-05 NOTE — DISCHARGE SUMMARY
General Medicine Discharge Summary     Patient ID:  Odalys Roche  45 year old  1/23/1980    Admit date: 5/4/2025    Discharge date and time: 5/5/25    Attending Physician: Hanna De Anda DO     Primary Care Physician: No primary care provider on file.     Discharge Diagnoses:   Acute appendicitis    Discharge Condition: stable    Disposition: home     Important Follow up:  [facility]  [address] [facility] [address]  [facility] [address]  [phone]    Follow up in 2 week(s)  For wound re-check      Hospital Course:    45-year-old female with a history of PALB2 gene mutation given her increased risk of breast cancer, history of pancreatic cancer in her father, who presents to the hospital for evaluation of abdominal pain which started suddenly the night prior to admission.      Acute appendicitis  -patient transferred from [facility] to [facility] floor  -surgery consulted  -CT scan reviewed  -signs and symptoms consistent with acute appendicitis   -patient went to OR on 5/4/25    Consults:   IP CONSULT TO GENERAL SURGERY    Operative Procedures:   Procedure(s) (LRB):  LAPAROSCOPIC  APPENDECTOMY (N/A)     Patient instructions:      Current Discharge Medication List        START taking these medications    Details   ibuprofen 600 MG Oral Tab Take 1 tablet (600 mg total) by mouth every 6 (six) hours as needed for Pain.           CONTINUE these medications which have NOT CHANGED    Details   cetirizine 1 MG/ML Oral Solution Take 5 mL (5 mg total) by mouth daily.      Phentermine-Topiramate (QSYMIA) 7.5-46 MG Oral Capsule SR 24 Hr Take 7.5-46 mg by mouth daily.           Code status: FULL     Exam on day of discharge:     Vitals:    05/05/25 0825   BP: 96/59   Pulse: 62   Resp: 18   Temp: 97.5 °F (36.4 °C)   General: no acute distress  Heart: RRR  Lungs: clear bilaterally  Abdomen: non-tender    Total time coordinating care for discharge: [facility]  than 30 minutes    Asrar Adilson, DO

## 2025-05-05 NOTE — PLAN OF CARE
Alert and oriented x4 on room air. Dressings clean, dry, and intact. IVF infusing. IV abx given. No episodes of emesis. Diet advanced to soft for breakfast this morning, can advance as tolerated. Voiding freely, up standby with IV pole. PRN norco for pain. Call light within reach.    Problem: Patient Centered Care  Goal: Patient preferences are identified and integrated in the patient's plan of care  Description: Interventions:- What would you like us to know as we care for you? - Provide timely, complete, and accurate information to patient/family- Incorporate patient and family knowledge, values, beliefs, and cultural backgrounds into the planning and delivery of care- Encourage patient/family to participate in care and decision-making at the level they choose- Honor patient and family perspectives and choices  Outcome: Progressing     Problem: Patient/Family Goals  Goal: Patient/Family Long Term Goal  Description: Patient's Long Term Goal: Interventions:- - See additional Care Plan goals for specific interventions  Outcome: Progressing  Goal: Patient/Family Short Term Goal  Description: Patient's Short Term Goal: Interventions: - - See additional Care Plan goals for specific interventions  Outcome: Progressing     Problem: PAIN - ADULT  Goal: Verbalizes/displays adequate comfort level or patient's stated pain goal  Description: INTERVENTIONS:- Encourage pt to monitor pain and request assistance- Assess pain using appropriate pain scale- Administer analgesics based on type and severity of pain and evaluate response- Implement non-pharmacological measures as appropriate and evaluate response- Consider cultural and social influences on pain and pain management- Manage/alleviate anxiety- Utilize distraction and/or relaxation techniques- Monitor for opioid side effects- Notify MD/LIP if interventions unsuccessful or patient reports new pain- Anticipate increased pain with activity and pre-medicate as  appropriate  Outcome: Progressing     Problem: RISK FOR INFECTION - ADULT  Goal: Absence of fever/infection during anticipated neutropenic period  Description: INTERVENTIONS- Monitor WBC- Administer growth factors as ordered- Implement neutropenic guidelines  Outcome: Progressing

## 2025-05-05 NOTE — PLAN OF CARE
Problem: PAIN - ADULT  Goal: Verbalizes/displays adequate comfort level or patient's stated pain goal  Description: INTERVENTIONS:- Encourage pt to monitor pain and request assistance- Assess pain using appropriate pain scale- Administer analgesics based on type and severity of pain and evaluate response- Implement non-pharmacological measures as appropriate and evaluate response- Consider cultural and social influences on pain and pain management- Manage/alleviate anxiety- Utilize distraction and/or relaxation techniques- Monitor for opioid side effects- Notify MD/LIP if interventions unsuccessful or patient reports new pain- Anticipate increased pain with activity and pre-medicate as appropriate  Outcome: Progressing     Problem: RISK FOR INFECTION - ADULT  Goal: Absence of fever/infection during anticipated neutropenic period  Description: INTERVENTIONS- Monitor WBC- Administer growth factors as ordered- Implement neutropenic guidelines  Outcome: Progressing

## 2025-05-07 NOTE — PAYOR COMM NOTE
--------------  ADMISSION REVIEW     Payor: [facility]  Subscriber #:  [id number]  Authorization Number: [id number]    Admit date: 5/4/25  Admit time: 12:09 PM       REVIEW DOCUMENTATION:    5/4 H&P    45-year-old female with a history of PALB2 gene mutation given her increased risk of breast cancer, history of pancreatic cancer in her father, who presents to the hospital for evaluation of abdominal pain which started suddenly last night.  The patient states that the pain was definitely in her lower abdomen.  She thought initially that it was gas, or even constipation.  She attempted to have a bowel movement last night.  She states while she was attempting this her pain was worse.  In the morning she did have a bowel movement but states that it appeared different than her usual.  She told her  to drive her to the immediate care.  She denies any recent fever.     OBJECTIVE:  /75 (BP Location: Left arm)   Pulse 74   Temp 98.4 °F (36.9 °C) (Oral)   Resp 16   LMP 04/27/2025   SpO2 100%   General: Alert, no acute distress  Lungs: clear to ausculation bilaterally  Heart: Regular rate and rhythm  Abdomen: soft, tender in RLQ  Extremities: No edema     Diagnostic Data:    CBC/Chem      Recent Labs   Lab 05/04/25  0846   MCV 88.3      Radiology: CT APPENDIX ABD/PEL W CONTRAST (CPT=74177)  Result Date: 5/4/2025  CONCLUSION:            1. Acute appendicitis with associated apparent obstructing proximal appendicolith. There is no evidence of periappendiceal abscess formation.  2. Left ovarian probable physiologic follicle.  3. Lesser incidental findings as above.    This report was communicated by telephone to the CT technologist at the dictation time shown below.  She will notify the patient's physician.    Dictated by (CST): Jn Bustillos MD on 5/04/2025 at 10:08 AM     Finalized by (CST): Jn Bustillos MD on 5/04/2025 at 10:13 AM           ASSESSMENT / PLAN:   45-year-old female with a history of PALB2  gene mutation given her increased risk of breast cancer, history of pancreatic cancer in her father, who presents to the hospital for evaluation of abdominal pain which started suddenly last night.      Acute appendicitis  -give IV saline bolus now, start infusion of saline  -IV antibiotics with rocephin and flagyl ordered  -general surgery consult  -NPO  -IV pain meds and antiemetics as needed  -she is fairly ok in regards to pain when I saw her      Fluids: 0.9 saline  Diet: NPO  DVT prophylaxis: hold chemical prophy  Code status: full code            5/4 Surgery    REASON FOR CONSULTATION:  Acute appendicitis.     HISTORY OF PRESENT ILLNESS:  The patient is 45, admitted to the hospital with sudden onset of abdominal pain localized to the lower abdomen.  CT suggests acute appendicitis.  She denies fevers.  LABORATORY DATA:  White count 12.2.     CT scan:  Acute appendicitis with proximal fecalith.  No abscess.       IMPRESSION:  Acute appendicitis.     PLAN:  Laparoscopic appendectomy.  Risks, benefits, options explained.          Pre-Operative Diagnosis: Acute appendicitis [K35.80]     Post-Operative Diagnosis: Acute appendicitis [K35.80]      Procedure Performed:   LAPAROSCOPIC  APPENDECTOMY     Surgeons and Role:     * Charlie Rodriguez MD - Primary     Assistant(s):  Surgical Assistant.: Salvador Robles CSA     Surgical Findings: Acute appendicitis     Specimen: Appendix     Estimated Blood Loss: Blood Output: 5 mL (5/4/2025  4:50 PM)        Dictation Number:       Charlie Rodriguez MD  5/4/2025            Vitals (last day) before discharge       Date/Time Temp Pulse Resp BP SpO2 Weight O2 Device O2 Flow Rate (L/min) [facility]    05/05/25 0825 97.5 °F (36.4 °C) 62 18 96/59 100 % -- None (Room air) -- SS    05/05/25 0438 97.5 °F (36.4 °C) 53 18 95/61 99 % -- None (Room air) -- DG    05/05/25 0003 98.3 °F (36.8 °C) 77 18 102/57 99 % -- None (Room air) -- DG    05/04/25 1952 98.3 °F (36.8 °C) 59 18 112/66 98 % -- None  (Room air) -- DG    05/04/25 1748 97.8 °F (36.6 °C) 59 18 105/85 94 % -- None (Room air) -- JA    05/04/25 1735 -- 57 17 100/52 99 % -- None (Room air) -- DS    05/04/25 1725 97.6 °F (36.4 °C) 56 18 93/51 99 % -- None (Room air) -- DS    05/04/25 1715 -- 61 19 93/55 99 % -- None (Room air) -- DS    05/04/25 1705 -- -- -- -- -- -- None (Room air) -- DS    05/04/25 1705 98.6 °F (37 °C) 82 16 97/47 100 % -- -- -- CB    05/04/25 1621 -- -- -- -- -- 138 lb (62.6 kg) -- -- CB    05/04/25 1600 -- -- -- -- -- 138 lb (62.6 kg) -- -- CB    05/04/25 1216 98.4 °F (36.9 °C) 74 16 111/75 100 % -- None (Room air) -- JA             --------------  DISCHARGE REVIEW    [facility]  Subscriber #:  [id number]  Authorization Number: [id number]    Admit date: 5/4/25  Admit time:  12:09 PM  Discharge Date: 5/5/2025  2:37 PM     Admitting Physician: Hanna De Anda DO  Attending Physician:  No att. providers found  Primary Care Physician: No primary care provider on file.          Discharge Summary Notes        Discharge Summary signed by Hanna De Anda DO at 5/5/2025  1:02 PM       Author: Hanna De Anda DO Specialty: HOSPITALIST Author Type: Physician    Filed: 5/5/2025  1:02 PM Date of Service: 5/5/2025 10:50 AM Status: Signed    : Hanna De Anda DO (Physician)                                                              General Medicine Discharge Summary     Patient ID:  Odalys Roche  45 year old  1/23/1980    Admit date: 5/4/2025    Discharge date and time: 5/5/25    Attending Physician: Hanna De Anda DO     Primary Care Physician: No primary care provider on file.     Discharge Diagnoses:   Acute appendicitis    Discharge Condition: stable    Disposition: home     Important Follow up:  [facility]  [address] [facility] [address]  [facility] [address]  [phone]    Follow up in 2 week(s)  For wound re-check      Hospital Course:    45-year-old female with a history of PALB2 gene mutation given her increased risk of breast  cancer, history of pancreatic cancer in her father, who presents to the hospital for evaluation of abdominal pain which started suddenly the night prior to admission.      Acute appendicitis  -patient transferred from [facility] to [facility] floor  -surgery consulted  -CT scan reviewed  -signs and symptoms consistent with acute appendicitis   -patient went to OR on 5/4/25    Consults:   IP CONSULT TO GENERAL SURGERY    Operative Procedures:   Procedure(s) (LRB):  LAPAROSCOPIC  APPENDECTOMY (N/A)     Patient instructions:      Current Discharge Medication List        START taking these medications    Details   ibuprofen 600 MG Oral Tab Take 1 tablet (600 mg total) by mouth every 6 (six) hours as needed for Pain.           CONTINUE these medications which have NOT CHANGED    Details   cetirizine 1 MG/ML Oral Solution Take 5 mL (5 mg total) by mouth daily.      Phentermine-Topiramate (QSYMIA) 7.5-46 MG Oral Capsule SR 24 Hr Take 7.5-46 mg by mouth daily.           Code status: FULL     Exam on day of discharge:     Vitals:    05/05/25 0825   BP: 96/59   Pulse: 62   Resp: 18   Temp: 97.5 °F (36.4 °C)   General: no acute distress  Heart: RRR  Lungs: clear bilaterally  Abdomen: non-tender    Total time coordinating care for discharge: Greater than 30 minutes    Hanna De Anda DO      Electronically signed by Hanna De Anda DO on 5/5/2025  1:02 PM         REVIEWER COMMENTS

## 2025-05-08 NOTE — PAYOR COMM NOTE
--------------  DISCHARGE REVIEW    Payor: [facility]  Subscriber #:  [id number]  Authorization Number: [id number]    Admit date: 5/4/25  Admit time:  12:09 PM  Discharge Date: 5/5/2025  2:37 PM     Admitting Physician: Hanna De Anda DO  Attending Physician:  No att. providers found  Primary Care Physician: No primary care provider on file.          Discharge Summary Notes        Discharge Summary signed by Hanna De Anda DO at 5/5/2025  1:02 PM       Author: Hanna De Anda DO Specialty: HOSPITALIST Author Type: Physician    Filed: 5/5/2025  1:02 PM Date of Service: 5/5/2025 10:50 AM Status: Signed    : Hanna De Anda DO (Physician)                                                              General Medicine Discharge Summary     Patient ID:  Odalys Roche  45 year old  1/23/1980    Admit date: 5/4/2025    Discharge date and time: 5/5/25    Attending Physician: Hanna De Anda DO     Primary Care Physician: No primary care provider on file.     Discharge Diagnoses:   Acute appendicitis    Discharge Condition: stable    Disposition: home     Important Follow up:  [facility]  [address] [address]  [phone]    Follow up in 2 week(s)  For wound re-check      Hospital Course:    45-year-old female with a history of PALB2 gene mutation given her increased risk of breast cancer, history of pancreatic cancer in her father, who presents to the hospital for evaluation of abdominal pain which started suddenly the night prior to admission.      Acute appendicitis  -patient transferred from [facility] to [facility] medical floor  -surgery consulted  -CT scan reviewed  -signs and symptoms consistent with acute appendicitis   -patient went to OR on 5/4/25    Consults:   IP CONSULT TO GENERAL SURGERY    Operative Procedures:   Procedure(s) (LRB):  LAPAROSCOPIC  APPENDECTOMY (N/A)     Patient instructions:      Current Discharge Medication List        START taking these  medications    Details   ibuprofen 600 MG Oral Tab Take 1 tablet (600 mg total) by mouth every 6 (six) hours as needed for Pain.           CONTINUE these medications which have NOT CHANGED    Details   cetirizine 1 MG/ML Oral Solution Take 5 mL (5 mg total) by mouth daily.      Phentermine-Topiramate (QSYMIA) 7.5-46 MG Oral Capsule SR 24 Hr Take 7.5-46 mg by mouth daily.           Code status: FULL     Exam on day of discharge:     Vitals:    05/05/25 0825   BP: 96/59   Pulse: 62   Resp: 18   Temp: 97.5 °F (36.4 °C)   General: no acute distress  Heart: RRR  Lungs: clear bilaterally  Abdomen: non-tender    Total time coordinating care for discharge: Greater than 30 minutes    Hanna De Anda DO      Electronically signed by Hanna De Anda DO on 5/5/2025  1:02 PM         REVIEWER COMMENTS

## 2025-05-19 ENCOUNTER — NURSE ONLY (OUTPATIENT)
Dept: SURGERY | Facility: CLINIC | Age: 45
End: 2025-05-19

## 2025-05-19 VITALS
SYSTOLIC BLOOD PRESSURE: 122 MMHG | DIASTOLIC BLOOD PRESSURE: 50 MMHG | BODY MASS INDEX: 24 KG/M2 | HEART RATE: 84 BPM | WEIGHT: 138 LBS

## 2025-05-19 DIAGNOSIS — Z48.89 ENCOUNTER FOR POSTOPERATIVE CARE: Primary | ICD-10-CM

## 2025-05-19 PROCEDURE — 3078F DIAST BP <80 MM HG: CPT

## 2025-05-19 PROCEDURE — 99024 POSTOP FOLLOW-UP VISIT: CPT

## 2025-05-19 PROCEDURE — 3074F SYST BP LT 130 MM HG: CPT

## 2025-05-19 NOTE — PROGRESS NOTES
S:  Odalys Roche is a 45 year old female sp Lap Appy  Doing well, no fevers, no chills, tolerating a general diet, generally normal bowel movements, no calf tenderness nor lower extremity edema, no shortness of breath, no chest pain.       O:  /50 (BP Location: Left arm, Patient Position: Sitting, Cuff Size: adult)   Pulse 84   Wt 138 lb (62.6 kg)   LMP 04/27/2025   BMI 23.56 kg/m²   GEN:  No acute distress  L: nonlabored respirations  H: reg rate  Abd:  Soft, NT,ND, incisions C/D/I, no erythema.  Extr: No edema, no calf tenderness    Path:  Reviewed w pt    Assessment   1. Encounter for postoperative care      Doing well sp Lap Appy.  Activity as tolerated.  Maintain a healthy diet.  Maintain good hydration.  F/u prn.         Gilson Graf PA-C

## 2025-08-10 ENCOUNTER — HOSPITAL ENCOUNTER (OUTPATIENT)
Age: 45
Discharge: HOME | End: 2025-08-10

## 2025-08-10 VITALS
RESPIRATION RATE: 22 BRPM | SYSTOLIC BLOOD PRESSURE: 113 MMHG | HEART RATE: 70 BPM | DIASTOLIC BLOOD PRESSURE: 69 MMHG | TEMPERATURE: 99 F | OXYGEN SATURATION: 100 %

## 2025-08-10 DIAGNOSIS — J06.9 VIRAL UPPER RESPIRATORY TRACT INFECTION: Primary | ICD-10-CM

## 2025-08-10 LAB — S PYO AG THROAT QL: NEGATIVE

## 2025-08-10 PROCEDURE — 87880 STREP A ASSAY W/OPTIC: CPT | Performed by: NURSE PRACTITIONER

## 2025-08-10 PROCEDURE — 99213 OFFICE O/P EST LOW 20 MIN: CPT | Performed by: NURSE PRACTITIONER

## (undated) DEVICE — MARYLAND JAW LAPAROSCOPIC SEALER/DIVIDER COATED: Brand: LIGASURE

## (undated) DEVICE — [HIGH FLOW INSUFFLATOR,  DO NOT USE IF PACKAGE IS DAMAGED,  KEEP DRY,  KEEP AWAY FROM SUNLIGHT,  PROTECT FROM HEAT AND RADIOACTIVE SOURCES.]: Brand: PNEUMOSURE

## (undated) DEVICE — ARTICULATION RELOAD WITH TRI-STAPLE TECHNOLOGY: Brand: ENDO GIA

## (undated) DEVICE — UNIVERSAL STAPLER: Brand: ENDO GIA ULTRA

## (undated) DEVICE — 3M™ STERI-STRIP™ COMPOUND BENZOIN TINCTURE 40 BAGS/CARTON 4 CARTONS/CASE C1544: Brand: 3M™ STERI-STRIP™

## (undated) DEVICE — GLOVE SUR 8 SENSICARE PI PIP GRN PWD F

## (undated) DEVICE — SOLUTION IRRIG 1000ML 0.9% NACL USP BTL

## (undated) DEVICE — BANDAGE ADH 1INX3IN NAT FAB N ADH PD CURAD

## (undated) DEVICE — GLOVE SUR 8 SENSICARE PI PIP CRM PWD F

## (undated) DEVICE — GLOVE SUR 7 SENSICARE PI MIC PIP CRM PWD F

## (undated) DEVICE — LAP CHOLE: Brand: MEDLINE INDUSTRIES, INC.

## (undated) DEVICE — Device: Brand: JELCO

## (undated) DEVICE — TROCARS: Brand: KII® BLUNT TIP ACCESS SYSTEM

## (undated) DEVICE — SOLUTION IV 1000ML 0.9% NACL PRESERVATIVE

## (undated) DEVICE — INSUFFLATION NEEDLE TO ESTABLISH PNEUMOPERITONEUM.: Brand: INSUFFLATION NEEDLE

## (undated) DEVICE — TROCAR: Brand: KII SHIELDED BLADED ACCESS SYSTEM

## (undated) DEVICE — TROCAR: Brand: KII FIOS FIRST ENTRY

## (undated) DEVICE — SUT MCRYL 3-0 18IN PS-2 ABSRB UD 19MM 3/8 CIR

## (undated) DEVICE — TISSUE RETRIEVAL SYSTEM: Brand: INZII RETRIEVAL SYSTEM

## (undated) DEVICE — SUT COAT VCRL+ 0 27IN UR-6 ABSRB VLT ANTIBACT

## (undated) DEVICE — 3M™ STERI-STRIP™ REINFORCED ADHESIVE SKIN CLOSURES, R1547, 1/2 IN X 4 IN (12 MM X 100 MM), 6 STRIPS/ENVELOPE: Brand: 3M™ STERI-STRIP™

## (undated) NOTE — LETTER
[facility]  [address], [place], [place]    Authorization for Surgical Operation and Procedure                               I hereby authorize Charlie Rodriguez MD, my physician and his/her assistants (if applicable), which may include medical students, residents, and/or fellows, to perform the following surgical operation/ procedure and administer such anesthesia as may be determined necessary by my physician: Operation/Procedure name (s) LAPAROSCOPIC  POSSIBLE OPEN APPENDECTOMY on Elisabeth C Beierle   2.   I recognize that during the surgical operation/procedure, unforeseen conditions may necessitate additional or different procedures than those listed above.  I, therefore, further authorize and request that the above-named surgeon, assistants, or designees perform such procedures as are, in their judgment, necessary and desirable.    3.   My surgeon/physician has discussed prior to my surgery the potential benefits, risks and side effects of this procedure; the likelihood of achieving goals; and potential problems that might occur during recuperation.  They also discussed reasonable alternatives to the procedure, including risks, benefits, and side effects related to the alternatives and risks related to not receiving this procedure.  I have had all my questions answered and I acknowledge that no guarantee has been made as to the result that may be obtained.    4.   Should the need arise during my operation/procedure, which includes change of level of care prior to discharge, I also consent to the administration of blood and/or blood products.  Further, I understand that despite careful testing and screening of blood or blood products by collecting agencies, I may still be subject to ill effects as a result of receiving a blood transfusion and/or blood products.  The following are some, but not all, of the potential risks that can occur: fever and allergic reactions, hemolytic reactions,  transmission of diseases such as Hepatitis, AIDS and Cytomegalovirus (CMV) and fluid overload.  In the event that I wish to have an autologous transfusion of my own blood, or a directed donor transfusion, I will discuss this with my physician.  Check only if Refusing Blood or Blood Products  I understand refusal of blood or blood products as deemed necessary by my physician may have serious consequences to my condition to include possible death. I hereby assume responsibility for my refusal and release the hospital, its personnel, and my physicians from any responsibility for the consequences of my refusal.    o  Refuse   5.   I authorize the use of any specimen, organs, tissues, body parts or foreign objects that may be removed from my body during the operation/procedure for diagnosis, research or teaching purposes and their subsequent disposal by hospital authorities.  I also authorize the release of specimen test results and/or written reports to my treating physician on the hospital medical staff or other referring or consulting physicians involved in my care, at the discretion of the Pathologist or my treating physician.    6.   I consent to the photographing or videotaping of the operations or procedures to be performed, including appropriate portions of my body for medical, scientific, or educational purposes, provided my identity is not revealed by the pictures or by descriptive texts accompanying them.  If the procedure has been photographed/videotaped, the surgeon will obtain the original picture, image, videotape or CD.  The hospital will not be responsible for storage, release or maintenance of the picture, image, tape or CD.    7.   I consent to the presence of a  or observers in the operating room as deemed necessary by my physician or their designees.    8.   I recognize that in the event my procedure results in extended X-Ray/fluoroscopy time, I may develop a skin reaction.    9. If  I have a Do Not Attempt Resuscitation (DNAR) order in place, that status will be suspended while in the operating room, procedural suite, and during the recovery period unless otherwise explicitly stated by me (or a person authorized to consent on my behalf). The surgeon or my attending physician will determine when the applicable recovery period ends for purposes of reinstating the DNAR order.  10. Patients having a sterilization procedure: I understand that if the procedure is successful the results will be permanent and it will therefore be impossible for me to inseminate, conceive, or bear children.  I also understand that the procedure is intended to result in sterility, although the result has not been guaranteed.   11. I acknowledge that my physician has explained sedation/analgesia administration to me including the risk and benefits I consent to the administration of sedation/analgesia as may be necessary or desirable in the judgment of my physician.    I CERTIFY THAT I HAVE READ AND FULLY UNDERSTAND THE ABOVE CONSENT TO OPERATION and/or OTHER PROCEDURE.     ____________________________________  _________________________________        ______________________________  Signature of Patient    Signature of Responsible Person                Printed Name of Responsible Person                                      ____________________________________  _____________________________                ________________________________  Signature of Witness        Date  Time         Relationship to Patient    STATEMENT OF PHYSICIAN My signature below affirms that prior to the time of the procedure; I have explained to the patient and/or his/her legal representative, the risks and benefits involved in the proposed treatment and any reasonable alternative to the proposed treatment. I have also explained the risks and benefits involved in refusal of the proposed treatment and alternatives to the proposed treatment and have  answered the patient's questions. If I have a significant financial interest in a co-management agreement or a significant financial interest in any product or implant, or other significant relationship used in this procedure/surgery, I have disclosed this and had a discussion with my patient.     _____________________________________________________              _____________________________  (Signature of Physician)                                                                                         (Date)                                   (Time)  Patient Name: Odalys Reyeseugenia      : 1980      Printed: 2025     Medical Record #: [id number]                                      Page 1 of 1

## (undated) NOTE — LETTER
[facility]  Administration of Anesthesia  IOdalys agree to be cared for by a physician anesthesiologist alone and/or with a nurse anesthetist, who is specially trained to monitor me and give me medicine to put me to sleep or keep me comfortable during my procedure    I understand that my anesthesiologist and/or anesthetist is not an employee or agent of [facility] or [employer]. He or she works for [facility]    As the patient asking for anesthesia services, I agree to:  Allow the anesthesiologist (anesthesia doctor) to give me medicine and do additional procedures as necessary. Some examples are: Starting or using an “IV” to give me medicine, fluids or blood during my procedure, and having a breathing tube placed to help me breathe when I’m asleep (intubation). In the event that my heart stops working properly, I understand that my anesthesiologist will make every effort to sustain my life, unless otherwise directed by [facility] Do Not Resuscitate documents.  Tell my anesthesia doctor before my procedure:  If I am pregnant.  The last time that I ate or drank.  iii. All of the medicines I take (including prescriptions, herbal supplements, and pills I can buy without a prescription (including street drugs/illegal medications). Failure to inform my anesthesiologist about these medicines may increase my risk of anesthetic complications.  iv.If I am allergic to anything or have had a reaction to anesthesia before.  I understand how the anesthesia medicine will help me (benefits).  I understand that with any type of anesthesia medicine there are risks:  The most common risks are: nausea, vomiting, sore throat, muscle soreness, damage to my eyes, mouth, or teeth (from breathing tube placement).  Rare risks include: remembering what happened during my procedure, allergic reactions to medications, injury to my airway, heart, lungs, vision, nerves, or  muscles and in extremely rare instances death.  My doctor has explained to me other choices available to me for my care (alternatives).  Pregnant Patients (“epidural”):  I understand that the risks of having an epidural (medicine given into my back to help control pain during labor), include itching, low blood pressure, difficulty urinating, headache or slowing of the baby’s heart. Very rare risks include infection, bleeding, seizure, irregular heart rhythms and nerve injury.  Regional Anesthesia (“spinal”, “epidural”, & “nerve blocks”):  I understand that rare but potential complications include headache, bleeding, infection, seizure, irregular heart rhythms, and nerve injury.    _____________________________________________________________________________  Patient (or Representative) Signature/Relationship to Patient  Date   Time    _____________________________________________________________________________   Name (if used)    Language/Organization   Time    _____________________________________________________________________________  Nurse Anesthetist Signature     Date   Time  _____________________________________________________________________________  Anesthesiologist Signature     Date   Time  I have discussed the procedure and information above with the patient (or patient’s representative) and answered their questions. The patient or their representative has agreed to have anesthesia services.    _____________________________________________________________________________  Witness        Date   Time  I have verified that the signature is that of the patient or patient’s representative, and that it was signed before the procedure  Patient Name: Odalys Roche     : 1980                 Printed: 2025 at 3:34 PM    Medical Record #: [id number]                                            Page 1 of 1  ----------ANESTHESIA CONSENT----------